# Patient Record
Sex: FEMALE | Race: WHITE | URBAN - NONMETROPOLITAN AREA
[De-identification: names, ages, dates, MRNs, and addresses within clinical notes are randomized per-mention and may not be internally consistent; named-entity substitution may affect disease eponyms.]

---

## 2017-03-15 ENCOUNTER — OFFICE VISIT (OUTPATIENT)
Dept: CARDIOLOGY CLINIC | Age: 77
End: 2017-03-15

## 2017-03-15 VITALS
HEIGHT: 62 IN | SYSTOLIC BLOOD PRESSURE: 118 MMHG | WEIGHT: 144 LBS | DIASTOLIC BLOOD PRESSURE: 70 MMHG | HEART RATE: 80 BPM | BODY MASS INDEX: 26.5 KG/M2

## 2017-03-15 DIAGNOSIS — R00.2 PALPITATION: ICD-10-CM

## 2017-03-15 DIAGNOSIS — E78.49 OTHER HYPERLIPIDEMIA: Primary | ICD-10-CM

## 2017-03-15 DIAGNOSIS — I10 ESSENTIAL HYPERTENSION: ICD-10-CM

## 2017-03-15 PROCEDURE — 99999 PR OFFICE/OUTPT VISIT,PROCEDURE ONLY: CPT | Performed by: INTERNAL MEDICINE

## 2017-03-15 PROCEDURE — 1036F TOBACCO NON-USER: CPT | Performed by: INTERNAL MEDICINE

## 2017-03-15 RX ORDER — CETIRIZINE HYDROCHLORIDE 10 MG/1
10 TABLET ORAL DAILY
COMMUNITY

## 2017-03-15 RX ORDER — RANITIDINE 150 MG/1
150 TABLET ORAL PRN
COMMUNITY
End: 2021-06-10

## 2017-03-15 RX ORDER — MONTELUKAST SODIUM 10 MG/1
10 TABLET ORAL NIGHTLY
COMMUNITY
End: 2017-10-16 | Stop reason: SDUPTHER

## 2017-03-15 RX ORDER — FLUTICASONE PROPIONATE 50 MCG
1 SPRAY, SUSPENSION (ML) NASAL PRN
COMMUNITY

## 2017-05-05 ENCOUNTER — TELEPHONE (OUTPATIENT)
Dept: CARDIOLOGY CLINIC | Age: 77
End: 2017-05-05

## 2017-06-19 ENCOUNTER — TELEPHONE (OUTPATIENT)
Dept: CARDIOLOGY CLINIC | Age: 77
End: 2017-06-19

## 2017-06-19 DIAGNOSIS — R07.9 CHEST PAIN, UNSPECIFIED TYPE: Primary | ICD-10-CM

## 2017-07-06 ENCOUNTER — HOSPITAL ENCOUNTER (OUTPATIENT)
Dept: NON INVASIVE DIAGNOSTICS | Age: 77
Discharge: HOME | End: 2017-07-06
Attending: INTERNAL MEDICINE | Admitting: INTERNAL MEDICINE

## 2017-07-06 DIAGNOSIS — R07.9 CHEST PAIN: ICD-10-CM

## 2017-07-10 ENCOUNTER — OFFICE VISIT (OUTPATIENT)
Dept: CARDIOLOGY CLINIC | Age: 77
End: 2017-07-10

## 2017-07-10 VITALS
SYSTOLIC BLOOD PRESSURE: 110 MMHG | HEIGHT: 62 IN | WEIGHT: 149 LBS | HEART RATE: 84 BPM | BODY MASS INDEX: 27.42 KG/M2 | DIASTOLIC BLOOD PRESSURE: 68 MMHG

## 2017-07-10 DIAGNOSIS — I10 ESSENTIAL HYPERTENSION: ICD-10-CM

## 2017-07-10 DIAGNOSIS — E78.49 OTHER HYPERLIPIDEMIA: Primary | ICD-10-CM

## 2017-07-10 PROCEDURE — 1123F ACP DISCUSS/DSCN MKR DOCD: CPT | Performed by: INTERNAL MEDICINE

## 2017-07-10 PROCEDURE — G8419 CALC BMI OUT NRM PARAM NOF/U: HCPCS | Performed by: INTERNAL MEDICINE

## 2017-07-10 PROCEDURE — G8427 DOCREV CUR MEDS BY ELIG CLIN: HCPCS | Performed by: INTERNAL MEDICINE

## 2017-07-10 PROCEDURE — 99214 OFFICE O/P EST MOD 30 MIN: CPT | Performed by: INTERNAL MEDICINE

## 2017-07-10 PROCEDURE — 1090F PRES/ABSN URINE INCON ASSESS: CPT | Performed by: INTERNAL MEDICINE

## 2017-07-10 PROCEDURE — G8400 PT W/DXA NO RESULTS DOC: HCPCS | Performed by: INTERNAL MEDICINE

## 2017-07-10 PROCEDURE — 1036F TOBACCO NON-USER: CPT | Performed by: INTERNAL MEDICINE

## 2017-07-10 PROCEDURE — 4040F PNEUMOC VAC/ADMIN/RCVD: CPT | Performed by: INTERNAL MEDICINE

## 2017-10-16 ENCOUNTER — OFFICE VISIT (OUTPATIENT)
Dept: CARDIOLOGY CLINIC | Age: 77
End: 2017-10-16

## 2017-10-16 VITALS
HEIGHT: 62 IN | WEIGHT: 144 LBS | BODY MASS INDEX: 26.5 KG/M2 | DIASTOLIC BLOOD PRESSURE: 70 MMHG | HEART RATE: 88 BPM | SYSTOLIC BLOOD PRESSURE: 120 MMHG

## 2017-10-16 DIAGNOSIS — E78.49 OTHER HYPERLIPIDEMIA: Primary | ICD-10-CM

## 2017-10-16 DIAGNOSIS — R00.2 PALPITATION: ICD-10-CM

## 2017-10-16 DIAGNOSIS — I10 ESSENTIAL HYPERTENSION: ICD-10-CM

## 2017-10-16 PROCEDURE — 1036F TOBACCO NON-USER: CPT | Performed by: INTERNAL MEDICINE

## 2017-10-16 PROCEDURE — 99999 PR OFFICE/OUTPT VISIT,PROCEDURE ONLY: CPT | Performed by: INTERNAL MEDICINE

## 2017-10-16 RX ORDER — MONTELUKAST SODIUM 10 MG/1
10 TABLET ORAL NIGHTLY PRN
Qty: 30 TABLET | Refills: 0
Start: 2017-10-16 | End: 2020-07-02

## 2017-10-16 NOTE — PROGRESS NOTES
Cardiac Follow up    Referring Provider:  Katheryn Paez MD     Chief Complaint   Patient presents with    3 Month Follow-Up    Hypertension    Swelling     in feet         History of Present Illness:  Mrs. Gladis Dias is a 68year old female here today in follow up. In May, she went to the ER for left arm pain. She had a follow up stress echo in July, which was unremarkable(aortic sclerosis) She follows for her history of palpitations, hypertension, and hyperlipidemia. She has had a gastric fundoplication in the past.  She has a  pulmonary embolism related to airline travel approximately     Today, Ric Pena has been feeling well. She has no chest pain, shortness of breath, palpitations, or dizziness. She does get some mild swelling in her feet on occasion, none today. She remains active without any difficulties. Her and her  are doing independent learning in assisted classes and enjoying it. They plan to go to [place] for a week in January. Past Medical History   has a past medical history of Essential hypertension and Hyperlipidemia. Pulmonary embolism    Surgical History:   has a past surgical history that includes Breast biopsy; Lithotripsy; Hysterectomy; Gastric fundoplication; and shoulder surgery (Left, 4-). Social History:   reports that she has quit smoking. She does not have any smokeless tobacco history on file. She reports that she drinks about 2.4 oz of alcohol per week . She reports that she does not use drugs. Family History:  family history includes Heart Disease in her father.      Home Medications:    Current Outpatient Prescriptions:     aspirin 81 MG tablet, Take 81 mg by mouth daily, Disp: , Rfl:     cetirizine (ZYRTEC ALLERGY) 10 MG tablet, Take 10 mg by mouth daily, Disp: , Rfl:     ranitidine (ZANTAC) 150 MG tablet, Take 150 mg by mouth as needed for Heartburn, Disp: , Rfl:     fluticasone (FLONASE) 50 MCG/ACT nasal spray, 1 spray by Nasal route as needed for Rhinitis, Disp: , Rfl:     potassium chloride (MICRO-K) 10 MEQ CR capsule, Take 10 mEq by mouth daily, Disp: , Rfl:     albuterol (PROVENTIL HFA;VENTOLIN HFA) 108 (90 BASE) MCG/ACT inhaler, Inhale 2 puffs into the lungs every 6 hours as needed for Wheezing., Disp: , Rfl:     Budesonide-Formoterol Fumarate (SYMBICORT IN),  Inhale 2 puffs into the lungs as needed , Disp: , Rfl:     atorvastatin (LIPITOR) 20 MG tablet, Take 20 mg by mouth daily. , Disp: , Rfl:     hydrochlorothiazide (MICROZIDE) 12.5 MG capsule, Take 12.5 mg by mouth daily. , Disp: , Rfl:     montelukast (SINGULAIR) 10 MG tablet, Take 10 mg by mouth nightly, Disp: , Rfl:     zoledronic acid (RECLAST) 5 MG/100ML SOLN, Infuse 5 mg intravenously once., Disp: , Rfl:       Allergies:  Aleve [naproxen sodium]     Review of Systems:   · Constitutional: there has been no unanticipated weight loss. fatigued   · Eyes: No visual changes or diplopia. No scleral icterus. · ENT: No Headaches, hearing loss or vertigo. No mouth sores or sore throat. · Cardiovascular: Reviewed in HPI  · Respiratory: No cough or wheezing, no sputum production. Short of breath  · Gastrointestinal: No abdominal pain, appetite loss, blood in stools. No change in bowel or bladder habits. · Genitourinary: No dysuria, trouble voiding, or hematuria. · Musculoskeletal:  No gait disturbance, weakness or joint complaints. · Integumentary: No rash or pruritis. · Neurological: No headache, diplopia, change in muscle strength, numbness or tingling. No change in gait, balance, coordination, mood, affect, memory, mentation, behavior. Otherwise, reviewed in HPI  · Psychiatric:  Rather substantial family stress  · Endocrine: No malaise, fatigue or temperature intolerance. No excessive thirst, fluid intake, or urination. No tremor. · Hematologic/Lymphatic: No abnormal bruising or bleeding, blood clots or swollen lymph nodes.   · Allergic/Immunologic:  Occasional nasal congestion, uses OTC antihistamine-nonsedating    Physical Examination:    Vitals:    10/16/17 0903   BP: 120/70   Pulse: 88        Constitutional and General Appearance:  NAD, pleasant  Respiratory:  · Normal excursion and expansion without use of accessory muscles  · Resp Auscultation: clear to ascultation  Cardiovascular:  · The apical impulse is not displaced  · Heart tones are crisp and normal  · Cervical veins are not engorged  · The carotid upstroke is normal in amplitude and contour without delay or bruit  · There is no clubbing, cyanosis of the extremities. · No edema  Abdomen:  · No masses or tenderness  · Bowel sounds present  · No organomegaly appreciated  Neurological/Psychiatric:  · Alert and oriented in all spheres  · Moves all extremities well  · Exhibits normal gait balance and coordination  · No abnormalities of mood, affect, memory, mentation, or behavior are noted    7/6/17 echo  Summary   -Normal left ventricle size, wall thickness and systolic function with an   estimated ejection fraction of 55%.  -The aortic valve appears sclerotic but opens well.   -There is mild-moderate tricuspid regurgitation with RVSP estimated at 31   mmHg. Assessment/Plan:   1. Hyperlipemia:  8/2016: , HDL 63, , TG 92; on atorvastatin 20 mg daily   2. Hypertension: /70 (Site: Left Arm, Position: Sitting, Cuff Size: Medium Adult)   Pulse 88   Ht 5' 2\" (1.575 m)   Wt 144 lb (65.3 kg)   BMI 26.34 kg/m²  well-controlled  3. Palpitations-resolved 7/17 stress echo unremarkable      Mrs. Vern Wu continues to be very well from a cardiac standpoint. Medications reviewed, no changes warranted. Follow up again with Dr Adin Dang in 6 months      Sulaiman Good M.D., [facility]    NOTE:  This report was transcribed using voice recognition software. Every effort was made to ensure accuracy; however, inadvertent computerized transcription errors may be present.

## 2018-05-31 ENCOUNTER — OFFICE VISIT (OUTPATIENT)
Dept: CARDIOLOGY CLINIC | Age: 78
End: 2018-05-31

## 2018-05-31 VITALS
WEIGHT: 144.6 LBS | HEIGHT: 62 IN | HEART RATE: 76 BPM | BODY MASS INDEX: 26.61 KG/M2 | DIASTOLIC BLOOD PRESSURE: 68 MMHG | SYSTOLIC BLOOD PRESSURE: 118 MMHG

## 2018-05-31 DIAGNOSIS — I10 ESSENTIAL HYPERTENSION: ICD-10-CM

## 2018-05-31 DIAGNOSIS — R00.2 PALPITATION: ICD-10-CM

## 2018-05-31 DIAGNOSIS — E78.49 OTHER HYPERLIPIDEMIA: Primary | ICD-10-CM

## 2018-05-31 PROCEDURE — G8427 DOCREV CUR MEDS BY ELIG CLIN: HCPCS | Performed by: INTERNAL MEDICINE

## 2018-05-31 PROCEDURE — 1036F TOBACCO NON-USER: CPT | Performed by: INTERNAL MEDICINE

## 2018-05-31 PROCEDURE — G8419 CALC BMI OUT NRM PARAM NOF/U: HCPCS | Performed by: INTERNAL MEDICINE

## 2018-05-31 PROCEDURE — 1123F ACP DISCUSS/DSCN MKR DOCD: CPT | Performed by: INTERNAL MEDICINE

## 2018-05-31 PROCEDURE — 4040F PNEUMOC VAC/ADMIN/RCVD: CPT | Performed by: INTERNAL MEDICINE

## 2018-05-31 PROCEDURE — 99214 OFFICE O/P EST MOD 30 MIN: CPT | Performed by: INTERNAL MEDICINE

## 2018-05-31 PROCEDURE — G8400 PT W/DXA NO RESULTS DOC: HCPCS | Performed by: INTERNAL MEDICINE

## 2018-05-31 PROCEDURE — 1090F PRES/ABSN URINE INCON ASSESS: CPT | Performed by: INTERNAL MEDICINE

## 2018-05-31 RX ORDER — ATORVASTATIN CALCIUM 40 MG/1
40 TABLET, FILM COATED ORAL DAILY
Qty: 90 TABLET | Refills: 3 | Status: SHIPPED | OUTPATIENT
Start: 2018-05-31 | End: 2018-06-01 | Stop reason: DRUGHIGH

## 2018-05-31 RX ORDER — OMEPRAZOLE 20 MG/1
20 CAPSULE, DELAYED RELEASE ORAL 2 TIMES DAILY
Qty: 60 CAPSULE | Refills: 5
Start: 2018-05-31 | End: 2020-07-02

## 2018-05-31 RX ORDER — OMEPRAZOLE 20 MG/1
20 CAPSULE, DELAYED RELEASE ORAL DAILY
COMMUNITY
End: 2018-05-31 | Stop reason: SDUPTHER

## 2018-06-01 ENCOUNTER — TELEPHONE (OUTPATIENT)
Dept: CARDIOLOGY CLINIC | Age: 78
End: 2018-06-01

## 2018-06-01 RX ORDER — ATORVASTATIN CALCIUM 20 MG/1
20 TABLET, FILM COATED ORAL DAILY
Qty: 30 TABLET | Refills: 11 | Status: SHIPPED | OUTPATIENT
Start: 2018-06-01 | End: 2018-10-04 | Stop reason: SDUPTHER

## 2018-06-04 ENCOUNTER — TELEPHONE (OUTPATIENT)
Dept: CARDIOLOGY CLINIC | Age: 78
End: 2018-06-04

## 2018-06-05 ENCOUNTER — TELEPHONE (OUTPATIENT)
Dept: CARDIOLOGY CLINIC | Age: 78
End: 2018-06-05

## 2018-06-06 ENCOUNTER — TELEPHONE (OUTPATIENT)
Dept: CARDIOLOGY CLINIC | Age: 78
End: 2018-06-06

## 2018-08-24 DIAGNOSIS — R93.1 ELEVATED CORONARY ARTERY CALCIUM SCORE: Primary | ICD-10-CM

## 2018-08-24 DIAGNOSIS — Z79.899 OTHER LONG TERM (CURRENT) DRUG THERAPY: ICD-10-CM

## 2018-08-28 ENCOUNTER — PROCEDURE VISIT (OUTPATIENT)
Dept: CARDIOLOGY CLINIC | Age: 78
End: 2018-08-28

## 2018-08-28 DIAGNOSIS — E78.49 OTHER HYPERLIPIDEMIA: Primary | ICD-10-CM

## 2018-08-28 DIAGNOSIS — R93.1 ELEVATED CORONARY ARTERY CALCIUM SCORE: ICD-10-CM

## 2018-08-28 DIAGNOSIS — Z79.899 OTHER LONG TERM (CURRENT) DRUG THERAPY: ICD-10-CM

## 2018-08-28 PROCEDURE — 93351 STRESS TTE COMPLETE: CPT | Performed by: INTERNAL MEDICINE

## 2018-08-28 PROCEDURE — 93320 DOPPLER ECHO COMPLETE: CPT | Performed by: INTERNAL MEDICINE

## 2018-08-28 PROCEDURE — 93325 DOPPLER ECHO COLOR FLOW MAPG: CPT | Performed by: INTERNAL MEDICINE

## 2018-10-01 LAB
CHOLESTEROL, TOTAL: 183 MG/DL
CHOLESTEROL: 128 MG/DL
HDLC SERPL-MCNC: 55 MG/DL
LDL CHOLESTEROL CALCULATED: 106 MG/DL
TRIGL SERPL-MCNC: 110 MG/DL

## 2018-10-04 ENCOUNTER — TELEPHONE (OUTPATIENT)
Dept: CARDIOLOGY CLINIC | Age: 78
End: 2018-10-04

## 2018-10-04 RX ORDER — ATORVASTATIN CALCIUM 40 MG/1
40 TABLET, FILM COATED ORAL DAILY
Qty: 90 TABLET | Refills: 3 | Status: SHIPPED | OUTPATIENT
Start: 2018-10-04 | End: 2018-12-07 | Stop reason: SDUPTHER

## 2018-12-07 ENCOUNTER — OFFICE VISIT (OUTPATIENT)
Dept: CARDIOLOGY CLINIC | Age: 78
End: 2018-12-07
Payer: MEDICARE

## 2018-12-07 VITALS
DIASTOLIC BLOOD PRESSURE: 72 MMHG | HEART RATE: 72 BPM | WEIGHT: 145.3 LBS | SYSTOLIC BLOOD PRESSURE: 116 MMHG | BODY MASS INDEX: 26.74 KG/M2 | HEIGHT: 62 IN

## 2018-12-07 DIAGNOSIS — R00.2 PALPITATION: ICD-10-CM

## 2018-12-07 DIAGNOSIS — E78.49 OTHER HYPERLIPIDEMIA: Primary | ICD-10-CM

## 2018-12-07 PROCEDURE — G8419 CALC BMI OUT NRM PARAM NOF/U: HCPCS | Performed by: INTERNAL MEDICINE

## 2018-12-07 PROCEDURE — 1036F TOBACCO NON-USER: CPT | Performed by: INTERNAL MEDICINE

## 2018-12-07 PROCEDURE — 99214 OFFICE O/P EST MOD 30 MIN: CPT | Performed by: INTERNAL MEDICINE

## 2018-12-07 PROCEDURE — 4040F PNEUMOC VAC/ADMIN/RCVD: CPT | Performed by: INTERNAL MEDICINE

## 2018-12-07 PROCEDURE — G8427 DOCREV CUR MEDS BY ELIG CLIN: HCPCS | Performed by: INTERNAL MEDICINE

## 2018-12-07 PROCEDURE — G8400 PT W/DXA NO RESULTS DOC: HCPCS | Performed by: INTERNAL MEDICINE

## 2018-12-07 PROCEDURE — 1101F PT FALLS ASSESS-DOCD LE1/YR: CPT | Performed by: INTERNAL MEDICINE

## 2018-12-07 PROCEDURE — G8484 FLU IMMUNIZE NO ADMIN: HCPCS | Performed by: INTERNAL MEDICINE

## 2018-12-07 PROCEDURE — 1123F ACP DISCUSS/DSCN MKR DOCD: CPT | Performed by: INTERNAL MEDICINE

## 2018-12-07 PROCEDURE — 1090F PRES/ABSN URINE INCON ASSESS: CPT | Performed by: INTERNAL MEDICINE

## 2018-12-07 RX ORDER — ATORVASTATIN CALCIUM 40 MG/1
40 TABLET, FILM COATED ORAL DAILY
Qty: 90 TABLET | Refills: 3 | Status: SHIPPED | OUTPATIENT
Start: 2018-12-07 | End: 2019-10-20 | Stop reason: SDUPTHER

## 2018-12-07 RX ORDER — HYDROCHLOROTHIAZIDE 12.5 MG/1
12.5 CAPSULE, GELATIN COATED ORAL DAILY
Qty: 90 CAPSULE | Refills: 3 | Status: SHIPPED | OUTPATIENT
Start: 2018-12-07 | End: 2019-11-18 | Stop reason: SDUPTHER

## 2018-12-07 RX ORDER — EZETIMIBE 10 MG/1
10 TABLET ORAL DAILY
Qty: 90 TABLET | Refills: 3 | Status: SHIPPED | OUTPATIENT
Start: 2018-12-07 | End: 2019-11-22 | Stop reason: SDUPTHER

## 2018-12-07 NOTE — PROGRESS NOTES
[address]   Cardiac f/up    Referring Provider:  Dr Lauren Medina     Chief Complaint   Patient presents with    6 Month Follow-Up    Hypertension      History of Present Illness:  Mrs. Liz Guajardo is here today in routine follow up; she is a former patient of Dr. Mechelle Cerda. She follows for her history of palpitations, hypertension, and hyperlipidemia. She had a stress echo in 2017 for left arm pain which was unremarkable (aortic sclerosis). She has had a gastric fundoplication in the past.  She had a  pulmonary embolism related to airline travel. Her father  age 48 of MI; her sister  in her 42's of possible arrhythmias.     Today, she is doing well. Lipids improved. Moderatley high coronary calcium LAD, RCA  Past Medical History:   has a past medical history of Essential hypertension and Hyperlipidemia. Surgical History:   has a past surgical history that includes Breast biopsy; Lithotripsy; Hysterectomy; Gastric fundoplication; and shoulder surgery (Left, 2013). Social History:   reports that she has quit smoking. She has never used smokeless tobacco. She reports that she drinks about 2.4 oz of alcohol per week . She reports that she does not use drugs. Family History:  family history includes Heart Disease in her father. Home Medications:  Prior to Admission medications    Medication Sig Start Date End Date Taking?  Authorizing Provider   atorvastatin (LIPITOR) 40 MG tablet Take 1 tablet by mouth daily 10/4/18  Yes Sarah Wolfe MD   omeprazole (PRILOSEC) 20 MG delayed release capsule Take 1 capsule by mouth 2 times daily  Patient taking differently: Take 20 mg by mouth 2 times daily as needed  18  Yes Sarah Wolfe MD   montelukast (SINGULAIR) 10 MG tablet Take 1 tablet by mouth nightly as needed (congestion) 10/16/17  Yes Som Arthur MD   aspirin 81 MG tablet Take 81 mg by mouth daily   Yes Historical Provider, MD   cetirizine (ZYRTEC ALLERGY) 10 MG tablet Take

## 2019-01-22 ENCOUNTER — TELEPHONE (OUTPATIENT)
Dept: CARDIOLOGY CLINIC | Age: 79
End: 2019-01-22

## 2019-01-22 LAB
ALT SERPL-CCNC: 23 IU/L (ref 10–35)
AST SERPL-CCNC: 21 IU/L (ref 10–35)
CHOLESTEROL, TOTAL: 146 MG/DL
CHOLESTEROL: 91 MG/DL
HDLC SERPL-MCNC: 55 MG/DL
LDL CHOLESTEROL CALCULATED: 76 MG/DL
TRIGL SERPL-MCNC: 77 MG/DL

## 2019-05-21 NOTE — PROGRESS NOTES
[facility]   Cardiac f/up    Referring Provider:  Dr Joselyn Butler     Chief Complaint   Patient presents with    Hyperlipidemia    Chest Pain     intermittent. not currently active. saw PCP for this       History of Present Illness:  Mrs. Michael Evans is a former patient of Dr. Kassy Ware. She follows for her history of palpitations, hypertension, and hyperlipidemia. She had a stress echo in 2017 for left arm pain which was unremarkable (aortic sclerosis). She has had a gastric fundoplication in the past.  She had a  pulmonary embolism related to airline travel. Her father  age 48 of MI; her sister  in her 42's of possible arrhythmias.     Today, she is doing well. She has been active working in her garden. States she has been having some intermittent \"chest pain\", not related to activity, could be lying in bed and it occurs. One day it lasted 3 hours, she considered going to the ED at that time. She has also seen her pcp, who thought it was acid reflux and prescribed prilosec. She only took it for 3 days, did not feel it helped. States she can not tell when it occurred last. Denies SALAZAR/PND, palpitations, light-headedness, edema. Past Medical History:   has a past medical history of Essential hypertension and Hyperlipidemia. Surgical History:   has a past surgical history that includes Breast biopsy; Lithotripsy; Hysterectomy; Gastric fundoplication; and shoulder surgery (Left, 2013). Social History:   reports that she has quit smoking. She has never used smokeless tobacco. She reports that she drinks about 2.4 oz of alcohol per week. She reports that she does not use drugs. Family History:  family history includes Heart Disease in her father. Home Medications:  Prior to Admission medications    Medication Sig Start Date End Date Taking?  Authorizing Provider   atorvastatin (LIPITOR) 40 MG tablet Take 1 tablet by mouth daily 18  Yes Moni Beaulieu MD hydrochlorothiazide (MICROZIDE) 12.5 MG capsule Take 1 capsule by mouth daily 12/7/18  Yes Kristy Herbert MD   ezetimibe (ZETIA) 10 MG tablet Take 1 tablet by mouth daily 12/7/18  Yes Kristy Herbert MD   omeprazole (PRILOSEC) 20 MG delayed release capsule Take 1 capsule by mouth 2 times daily  Patient taking differently: Take 20 mg by mouth 2 times daily as needed  5/31/18  Yes Kristy Herbert MD   montelukast (SINGULAIR) 10 MG tablet Take 1 tablet by mouth nightly as needed (congestion) 10/16/17  Yes Larissa Stanford MD   aspirin 81 MG tablet Take 81 mg by mouth daily   Yes Historical Provider, MD   cetirizine (ZYRTEC ALLERGY) 10 MG tablet Take 10 mg by mouth daily   Yes Historical Provider, MD   ranitidine (ZANTAC) 150 MG tablet Take 150 mg by mouth as needed for Heartburn   Yes Historical Provider, MD   fluticasone (FLONASE) 50 MCG/ACT nasal spray 1 spray by Nasal route as needed for Rhinitis   Yes Historical Provider, MD   potassium chloride (MICRO-K) 10 MEQ CR capsule Take 10 mEq by mouth daily   Yes Historical Provider, MD   albuterol (PROVENTIL HFA;VENTOLIN HFA) 108 (90 BASE) MCG/ACT inhaler Inhale 2 puffs into the lungs every 6 hours as needed for Wheezing. Yes Historical Provider, MD   Budesonide-Formoterol Fumarate (SYMBICORT IN)   Inhale 2 puffs into the lungs as needed    Yes Historical Provider, MD        Allergies:  Aleve [naproxen sodium]     Review of Systems:   · Constitutional: there has been no unanticipated weight loss. There's been no change in energy level, sleep pattern, or activity level. · Eyes: No visual changes or diplopia. No scleral icterus. · ENT: No Headaches, hearing loss or vertigo. No mouth sores or sore throat. · Cardiovascular: Reviewed in HPI  · Respiratory: No cough or wheezing, no sputum production. No hematemesis. · Gastrointestinal: No abdominal pain, appetite loss, blood in stools. No change in bowel or bladder habits.   · Genitourinary: No dysuria, trouble voiding, or hematuria. · Musculoskeletal:  No gait disturbance, weakness or joint complaints. · Integumentary: No rash or pruritis. · Neurological: No headache, diplopia, change in muscle strength, numbness or tingling. No change in gait, balance, coordination, mood, affect, memory, mentation, behavior. · Psychiatric: No anxiety, no depression. · Endocrine: No malaise, fatigue or temperature intolerance. No excessive thirst, fluid intake, or urination. No tremor. · Hematologic/Lymphatic: No abnormal bruising or bleeding, blood clots or swollen lymph nodes. · Allergic/Immunologic: No nasal congestion or hives. Physical Examination:    Vitals:    05/28/19 0905   BP: 112/67   Pulse: 93        Constitutional and General Appearance: Appears stated age, NAD   Skin:good turgor,intact without lesions  HEENT: EOMI ,normal  Neck:no JVD    Respiratory:  · Normal excursion and expansion without use of accessory muscles  · Resp Auscultation: Normal breath sounds without dullness  Cardiovascular:  · The apical impulses not displaced  · Heart tones are crisp and normal  · Cervical veins are not engorged  · The carotid upstroke is normal in amplitude and contour without delay or bruit  · Peripheral pulses are symmetrical and full  · There is no clubbing, cyanosis of the extremities.   · No edema  · Femoral Arteries: 2+ and equal  · Pedal Pulses: 2+ and equal   Abdomen:  · No masses or tenderness  · Liver/Spleen: No Abnormalities Noted  Neurological/Psychiatric:  · Alert and oriented in all spheres  · Moves all extremities well  · Exhibits normal gait balance and coordination  · No abnormalities of mood, affect, memory, mentation, or behavior are noted    Assessment:    Chest pain, intermittent, nonexertional    -Stress Echo today - normal     Hypertension  Well controlled  /70 (Site: Left Arm, Position: Sitting, Cuff Size: Medium Adult)   Pulse 88   Ht 5' 2\" (1.575 m)   Wt 144 lb (65.3 kg)   BMI 26.34 kg/m² Hyperlipidemia  1/22/19> , TG 96, HDL 52,   40 mg daily      6/1/18   CORONARY ARTERY CALCIUM SCORE FINDINGS:       ARTERY    SCORE       LM                114   LAD              170   CX                13   RCA             8       TOTAL         305         Palpitations, history of  Resolved   7/2017 stress echo was unremarkable  8/28/18 Normal stress echocardiogram study  5/28/19 Normal     Acid reflux  Usually at night   Prilosec 20mg  Bid.- does not take   Per GI -difficulty emptying colon       Plan:  Mrs. Gladis Erwin has a stable cardiac status. Recent labs rev'd.  1. Stress Echo today to evaluate chest pain is normal   2. Will continue with risk factor modifications. 3. Return for regular follow up in 6 months. I appreciate the opportunity of cooperating in the care of this individual.    Ace Frausto. Fronie Mohs, M.D., [address] attestation: This note was scribed in the presence of Dr. Fronie Mohs MD, by Wagner Foley RN. The scribe's documentation has been prepared under my direction and personally reviewed by me in its entirety. I confirm that the note above accurately reflects all work, treatment, procedures, and medical decision making performed by me.

## 2019-05-28 ENCOUNTER — PROCEDURE VISIT (OUTPATIENT)
Dept: CARDIOLOGY CLINIC | Age: 79
End: 2019-05-28
Payer: MEDICARE

## 2019-05-28 ENCOUNTER — OFFICE VISIT (OUTPATIENT)
Dept: CARDIOLOGY CLINIC | Age: 79
End: 2019-05-28
Payer: MEDICARE

## 2019-05-28 VITALS
DIASTOLIC BLOOD PRESSURE: 67 MMHG | HEIGHT: 62 IN | SYSTOLIC BLOOD PRESSURE: 112 MMHG | HEART RATE: 93 BPM | BODY MASS INDEX: 25.58 KG/M2 | WEIGHT: 139 LBS

## 2019-05-28 DIAGNOSIS — I10 ESSENTIAL HYPERTENSION: ICD-10-CM

## 2019-05-28 DIAGNOSIS — E78.49 OTHER HYPERLIPIDEMIA: ICD-10-CM

## 2019-05-28 DIAGNOSIS — R07.9 CHEST PAIN, UNSPECIFIED TYPE: ICD-10-CM

## 2019-05-28 DIAGNOSIS — R07.9 CHEST PAIN, UNSPECIFIED TYPE: Primary | ICD-10-CM

## 2019-05-28 LAB
LV EF: 50 %
LVEF MODALITY: NORMAL

## 2019-05-28 PROCEDURE — G8427 DOCREV CUR MEDS BY ELIG CLIN: HCPCS | Performed by: INTERNAL MEDICINE

## 2019-05-28 PROCEDURE — G8400 PT W/DXA NO RESULTS DOC: HCPCS | Performed by: INTERNAL MEDICINE

## 2019-05-28 PROCEDURE — 99214 OFFICE O/P EST MOD 30 MIN: CPT | Performed by: INTERNAL MEDICINE

## 2019-05-28 PROCEDURE — G8419 CALC BMI OUT NRM PARAM NOF/U: HCPCS | Performed by: INTERNAL MEDICINE

## 2019-05-28 PROCEDURE — 93351 STRESS TTE COMPLETE: CPT | Performed by: INTERNAL MEDICINE

## 2019-05-28 PROCEDURE — 93320 DOPPLER ECHO COMPLETE: CPT | Performed by: INTERNAL MEDICINE

## 2019-05-28 PROCEDURE — 1090F PRES/ABSN URINE INCON ASSESS: CPT | Performed by: INTERNAL MEDICINE

## 2019-05-28 PROCEDURE — 1036F TOBACCO NON-USER: CPT | Performed by: INTERNAL MEDICINE

## 2019-05-28 PROCEDURE — 1123F ACP DISCUSS/DSCN MKR DOCD: CPT | Performed by: INTERNAL MEDICINE

## 2019-05-28 PROCEDURE — 4040F PNEUMOC VAC/ADMIN/RCVD: CPT | Performed by: INTERNAL MEDICINE

## 2019-10-23 RX ORDER — ATORVASTATIN CALCIUM 40 MG/1
TABLET, FILM COATED ORAL
Qty: 90 TABLET | Refills: 1 | Status: SHIPPED | OUTPATIENT
Start: 2019-10-23 | End: 2020-04-20

## 2019-11-08 ENCOUNTER — OFFICE VISIT (OUTPATIENT)
Dept: CARDIOLOGY CLINIC | Age: 79
End: 2019-11-08
Payer: MEDICARE

## 2019-11-08 VITALS
DIASTOLIC BLOOD PRESSURE: 70 MMHG | BODY MASS INDEX: 27.22 KG/M2 | HEIGHT: 62 IN | SYSTOLIC BLOOD PRESSURE: 110 MMHG | HEART RATE: 68 BPM | WEIGHT: 147.9 LBS

## 2019-11-08 DIAGNOSIS — I10 ESSENTIAL HYPERTENSION: Primary | ICD-10-CM

## 2019-11-08 DIAGNOSIS — E78.49 OTHER HYPERLIPIDEMIA: ICD-10-CM

## 2019-11-08 DIAGNOSIS — R00.2 PALPITATION: ICD-10-CM

## 2019-11-08 PROCEDURE — 1123F ACP DISCUSS/DSCN MKR DOCD: CPT | Performed by: INTERNAL MEDICINE

## 2019-11-08 PROCEDURE — G8400 PT W/DXA NO RESULTS DOC: HCPCS | Performed by: INTERNAL MEDICINE

## 2019-11-08 PROCEDURE — 1036F TOBACCO NON-USER: CPT | Performed by: INTERNAL MEDICINE

## 2019-11-08 PROCEDURE — G8484 FLU IMMUNIZE NO ADMIN: HCPCS | Performed by: INTERNAL MEDICINE

## 2019-11-08 PROCEDURE — 4040F PNEUMOC VAC/ADMIN/RCVD: CPT | Performed by: INTERNAL MEDICINE

## 2019-11-08 PROCEDURE — 99214 OFFICE O/P EST MOD 30 MIN: CPT | Performed by: INTERNAL MEDICINE

## 2019-11-08 PROCEDURE — G8427 DOCREV CUR MEDS BY ELIG CLIN: HCPCS | Performed by: INTERNAL MEDICINE

## 2019-11-08 PROCEDURE — G8417 CALC BMI ABV UP PARAM F/U: HCPCS | Performed by: INTERNAL MEDICINE

## 2019-11-08 PROCEDURE — 1090F PRES/ABSN URINE INCON ASSESS: CPT | Performed by: INTERNAL MEDICINE

## 2019-11-18 RX ORDER — HYDROCHLOROTHIAZIDE 12.5 MG/1
CAPSULE, GELATIN COATED ORAL
Qty: 90 CAPSULE | Refills: 3 | Status: SHIPPED | OUTPATIENT
Start: 2019-11-18 | End: 2020-05-26 | Stop reason: ALTCHOICE

## 2019-11-26 RX ORDER — EZETIMIBE 10 MG/1
TABLET ORAL
Qty: 90 TABLET | Refills: 0 | Status: SHIPPED | OUTPATIENT
Start: 2019-11-26 | End: 2020-02-24

## 2020-02-05 ENCOUNTER — TELEPHONE (OUTPATIENT)
Dept: CARDIOLOGY CLINIC | Age: 80
End: 2020-02-05

## 2020-02-05 NOTE — TELEPHONE ENCOUNTER
[facility]

## 2020-02-24 RX ORDER — EZETIMIBE 10 MG/1
TABLET ORAL
Qty: 90 TABLET | Refills: 0 | Status: SHIPPED | OUTPATIENT
Start: 2020-02-24 | End: 2020-05-26 | Stop reason: SDUPTHER

## 2020-04-20 NOTE — TELEPHONE ENCOUNTER
Prescription refill    Last OV:  05/28/2019    Last Refill:  10/23/2019    Labs:  01/22/2019    Future Appt:  05/26/2020

## 2020-04-21 RX ORDER — ATORVASTATIN CALCIUM 40 MG/1
TABLET, FILM COATED ORAL
Qty: 90 TABLET | Refills: 1 | Status: SHIPPED | OUTPATIENT
Start: 2020-04-21 | End: 2020-10-15

## 2020-05-26 ENCOUNTER — OFFICE VISIT (OUTPATIENT)
Dept: CARDIOLOGY CLINIC | Age: 80
End: 2020-05-26
Payer: MEDICARE

## 2020-05-26 VITALS
HEART RATE: 84 BPM | SYSTOLIC BLOOD PRESSURE: 110 MMHG | DIASTOLIC BLOOD PRESSURE: 58 MMHG | WEIGHT: 146 LBS | HEIGHT: 62 IN | BODY MASS INDEX: 26.87 KG/M2 | TEMPERATURE: 99 F

## 2020-05-26 PROCEDURE — 4040F PNEUMOC VAC/ADMIN/RCVD: CPT | Performed by: INTERNAL MEDICINE

## 2020-05-26 PROCEDURE — G8427 DOCREV CUR MEDS BY ELIG CLIN: HCPCS | Performed by: INTERNAL MEDICINE

## 2020-05-26 PROCEDURE — 99213 OFFICE O/P EST LOW 20 MIN: CPT | Performed by: INTERNAL MEDICINE

## 2020-05-26 PROCEDURE — 1090F PRES/ABSN URINE INCON ASSESS: CPT | Performed by: INTERNAL MEDICINE

## 2020-05-26 PROCEDURE — 1036F TOBACCO NON-USER: CPT | Performed by: INTERNAL MEDICINE

## 2020-05-26 PROCEDURE — G8400 PT W/DXA NO RESULTS DOC: HCPCS | Performed by: INTERNAL MEDICINE

## 2020-05-26 PROCEDURE — 1123F ACP DISCUSS/DSCN MKR DOCD: CPT | Performed by: INTERNAL MEDICINE

## 2020-05-26 PROCEDURE — 93000 ELECTROCARDIOGRAM COMPLETE: CPT | Performed by: INTERNAL MEDICINE

## 2020-05-26 PROCEDURE — G8417 CALC BMI ABV UP PARAM F/U: HCPCS | Performed by: INTERNAL MEDICINE

## 2020-05-26 RX ORDER — EZETIMIBE 10 MG/1
TABLET ORAL
Qty: 90 TABLET | Refills: 0 | Status: SHIPPED | OUTPATIENT
Start: 2020-05-26 | End: 2020-09-04

## 2020-05-26 NOTE — PROGRESS NOTES
[facility]   Cardiac Follow Up    Referring Provider:  Dr Ced Penn     Chief Complaint   Patient presents with    6 Month Follow-Up     Pt states she has been having problems with dizziness working outside. She has chest pressure when she lays down.  Hyperlipidemia    Hypertension      History of Present Illness:  Mrs. Subhash Lucas is a former patient of Dr. Anjali Villanueva. She follows for her history of palpitations, hypertension, and hyperlipidemia. She had a stress echo in 2017 for left arm pain which was unremarkable (aortic sclerosis). She has had a gastric fundoplication in the past.  She had a  pulmonary embolism related to airline travel. Her father  age 48 of MI; her sister  in her 42's of possible arrhythmias.     Today, she reports episodes of dizziness when working in the yard in the heat. Denies exertional chest pain, SALAZAR/PND, palpitations, edema. She is active, does not smoke. She lives at the [place]. Past Medical History:   has a past medical history of Essential hypertension and Hyperlipidemia. Surgical History:   has a past surgical history that includes Breast biopsy; Lithotripsy; Hysterectomy; Gastric fundoplication; and shoulder surgery (Left, 2013). Social History:   reports that she has quit smoking. She has never used smokeless tobacco. She reports current alcohol use of about 4.0 standard drinks of alcohol per week. She reports that she does not use drugs. Family History:  family history includes Heart Disease in her father. Home Medications:  Prior to Admission medications    Medication Sig Start Date End Date Taking?  Authorizing Provider   linaclotide Shirlyn Medici) 145 MCG capsule Take 145 mcg by mouth nightly 20  Yes Historical Provider, MD   atorvastatin (LIPITOR) 40 MG tablet TAKE ONE TABLET BY MOUTH DAILY 20  Yes Paul Huynh MD   ezetimibe (ZETIA) 10 MG tablet TAKE ONE TABLET BY MOUTH DAILY 20  Yes Paul Hyunh MD

## 2020-07-02 ENCOUNTER — VIRTUAL VISIT (OUTPATIENT)
Dept: CARDIOLOGY CLINIC | Age: 80
End: 2020-07-02

## 2020-07-21 RX ORDER — HYDROCHLOROTHIAZIDE 12.5 MG/1
CAPSULE, GELATIN COATED ORAL
Qty: 90 CAPSULE | Refills: 2 | OUTPATIENT
Start: 2020-07-21

## 2020-09-04 RX ORDER — EZETIMIBE 10 MG/1
TABLET ORAL
Qty: 90 TABLET | Refills: 0 | Status: SHIPPED | OUTPATIENT
Start: 2020-09-04 | End: 2020-12-10

## 2020-10-15 RX ORDER — ATORVASTATIN CALCIUM 40 MG/1
TABLET, FILM COATED ORAL
Qty: 90 TABLET | Refills: 1 | Status: SHIPPED | OUTPATIENT
Start: 2020-10-15 | End: 2021-06-04

## 2020-10-15 NOTE — TELEPHONE ENCOUNTER
RX APPROVAL:      Refill:   Requested Prescriptions     Pending Prescriptions Disp Refills    atorvastatin (LIPITOR) 40 MG tablet [Pharmacy Med Name: ATORVASTATIN 40 MG TABLET] 90 tablet 0     Sig: TAKE ONE TABLET BY MOUTH DAILY      Last OV: 5/26/20  Last EKG:    Last Labs:  Lab Results   Component Value Date    CHOL 126 05/26/2020    TRIG 52 05/26/2020    HDL 49 05/26/2020    LDLCALC 67 05/26/2020     Lab Results   Component Value Date    ALT 17 05/26/2020    AST 21 05/26/2020       Plan and labs reviewed

## 2020-12-10 RX ORDER — EZETIMIBE 10 MG/1
TABLET ORAL
Qty: 90 TABLET | Refills: 1 | Status: SHIPPED | OUTPATIENT
Start: 2020-12-10 | End: 2021-06-02

## 2020-12-10 NOTE — TELEPHONE ENCOUNTER
RX APPROVAL:      Refill:   Requested Prescriptions     Pending Prescriptions Disp Refills    ezetimibe (Azalia Davis) 10 MG tablet [Pharmacy Med Name: EZETIMIBE 10 MG TABLET] 90 tablet 0     Sig: TAKE ONE TABLET BY MOUTH DAILY      Last OV: 5/26/2020   Last EKG:    Last Labs:  Lab Results   Component Value Date    CHOL 126 05/26/2020    TRIG 52 05/26/2020    HDL 49 05/26/2020    LDLCALC 67 05/26/2020     Lab Results   Component Value Date    ALT 17 05/26/2020    AST 21 05/26/2020       Plan and labs reviewed

## 2020-12-18 ENCOUNTER — OFFICE VISIT (OUTPATIENT)
Dept: CARDIOLOGY CLINIC | Age: 80
End: 2020-12-18
Payer: MEDICARE

## 2020-12-18 VITALS
HEART RATE: 68 BPM | BODY MASS INDEX: 25.87 KG/M2 | SYSTOLIC BLOOD PRESSURE: 116 MMHG | RESPIRATION RATE: 20 BRPM | HEIGHT: 63 IN | DIASTOLIC BLOOD PRESSURE: 62 MMHG | OXYGEN SATURATION: 96 % | WEIGHT: 146 LBS

## 2020-12-18 PROCEDURE — G8427 DOCREV CUR MEDS BY ELIG CLIN: HCPCS | Performed by: INTERNAL MEDICINE

## 2020-12-18 PROCEDURE — 1090F PRES/ABSN URINE INCON ASSESS: CPT | Performed by: INTERNAL MEDICINE

## 2020-12-18 PROCEDURE — G8400 PT W/DXA NO RESULTS DOC: HCPCS | Performed by: INTERNAL MEDICINE

## 2020-12-18 PROCEDURE — 1123F ACP DISCUSS/DSCN MKR DOCD: CPT | Performed by: INTERNAL MEDICINE

## 2020-12-18 PROCEDURE — 4040F PNEUMOC VAC/ADMIN/RCVD: CPT | Performed by: INTERNAL MEDICINE

## 2020-12-18 PROCEDURE — 99213 OFFICE O/P EST LOW 20 MIN: CPT | Performed by: INTERNAL MEDICINE

## 2020-12-18 PROCEDURE — G8417 CALC BMI ABV UP PARAM F/U: HCPCS | Performed by: INTERNAL MEDICINE

## 2020-12-18 PROCEDURE — 1036F TOBACCO NON-USER: CPT | Performed by: INTERNAL MEDICINE

## 2020-12-18 PROCEDURE — G8484 FLU IMMUNIZE NO ADMIN: HCPCS | Performed by: INTERNAL MEDICINE

## 2020-12-18 RX ORDER — POLYETHYLENE GLYCOL 3350 17 G/17G
POWDER, FOR SOLUTION ORAL
COMMUNITY
Start: 2020-01-03 | End: 2022-07-26

## 2020-12-18 NOTE — PROGRESS NOTES
[facility]   Cardiac Follow Up    Referring Provider:  Dr Momin Pomeroy     Chief Complaint   Patient presents with    Follow-up    Hypertension    Hyperlipidemia      History of Present Illness:  Mrs. Savannah Turner is a former patient of Dr. Evette Panda. She follows for her history of palpitations, hypertension, and hyperlipidemia. She had a stress echo in 2017 for left arm pain which was unremarkable (aortic sclerosis). She has had a gastric fundoplication in the past.  She had a  pulmonary embolism related to airline travel. Her father  age 48 of MI; her sister  in her 42's of possible arrhythmias.     Today, she reports she has been well. Denies SALAZAR/PND, palpitations, edema. She is active, does not smoke. She lives at the [place]. She has occasional pains in her chest, this is at rest.     Past Medical History:   has a past medical history of Essential hypertension and Hyperlipidemia. Surgical History:   has a past surgical history that includes Breast biopsy; Lithotripsy; Hysterectomy; Gastric fundoplication; and shoulder surgery (Left, 2013). Social History:   reports that she has quit smoking. She has never used smokeless tobacco. She reports current alcohol use of about 4.0 standard drinks of alcohol per week. She reports that she does not use drugs. Family History:  family history includes Heart Disease in her father. Home Medications:  Prior to Admission medications    Medication Sig Start Date End Date Taking?  Authorizing Provider   polyethylene glycol (GLYCOLAX) 17 GM/SCOOP powder Mix 15 doses (255 gm) in 64 oz of fluid, drink slowly over 1 day 1/3/20  Yes Historical Provider, MD   ezetimibe (ZETIA) 10 MG tablet TAKE ONE TABLET BY MOUTH DAILY 12/10/20  Yes Kelly Gary MD   atorvastatin (LIPITOR) 40 MG tablet TAKE ONE TABLET BY MOUTH DAILY 10/15/20  Yes Kelly Gary MD   aspirin 81 MG tablet Take 81 mg by mouth daily   Yes Historical Provider, MD   cetirizine Constitutional and General Appearance: Appears stated age, NAD   Skin:good turgor,intact without lesions  HEENT: EOMI ,normal  Neck:no JVD    Respiratory:  · Normal excursion and expansion without use of accessory muscles  · Resp Auscultation: Normal breath sounds without dullness  Cardiovascular:  · The apical impulses not displaced  · Heart tones are crisp and normal  · Cervical veins are not engorged  · The carotid upstroke is normal in amplitude and contour without delay or bruit  · Peripheral pulses are symmetrical and full  · There is no clubbing, cyanosis of the extremities. · No edema  · Femoral Arteries: 2+ and equal  · Pedal Pulses: 2+ and equal   Abdomen:  · No masses or tenderness  · Liver/Spleen: No Abnormalities Noted  Neurological/Psychiatric:  · Alert and oriented in all spheres  · Moves all extremities well  · Exhibits normal gait balance and coordination  · No abnormalities of mood, affect, memory, mentation, or behavior are noted    Assessment:    Hypertension  Well controlled  Blood pressure 116/62, pulse 68, resp. rate 20, height 5' 3\" (1.6 m), weight 146 lb (66.2 kg), SpO2 96 %. Hyperlipidemia  5/26/20> , TG 52, HDL 49, LDL 67  Lipitor 40 mg daily & Zetia 10 mg   Controlled- check yearly      Palpitations, history of  Resolved   7/2017 stress echo was unremarkable  8/28/18 Normal stress echocardiogram study  5/28/19 Normal   5/26/20 EKG> SR 78       Plan:  Cas Sood has a stable cardiac status. Cardiac test and lab results personally reviewed by me during this office visit and discussed. No med changes   Lipids prior to next OV   Continue risk factor modifications. Call for any change in symptoms. Return for regular follow up in 6 months with stress echo. I appreciate the opportunity of cooperating in the care of this individual.    Dom Corona.  Ibis Giles M.D., [facility]    Patient's problem list, medications, allergies, past medical, surgical, social and family histories

## 2021-04-21 ENCOUNTER — TELEPHONE (OUTPATIENT)
Dept: CARDIOLOGY CLINIC | Age: 81
End: 2021-04-21

## 2021-04-21 NOTE — TELEPHONE ENCOUNTER
Pt calling with concerns of a pain in her mid chest area when she bends over she is asking to be seen sooner,  first available in OX is may 13th  pt thinks she needs to be seen sooner pls call to advise thank you

## 2021-04-21 NOTE — TELEPHONE ENCOUNTER
Pt was not home at the time of the call her  stated they would take the 4/27 appt because he has appt on 4/22, he will run the appt by her if its not ok they will call back

## 2021-04-21 NOTE — TELEPHONE ENCOUNTER
Could add her tomorrow at 9:45 here at [facility] with LES or if she needs it to be OX 4/27 at 9:45a , appt needs w/ EKG. Thanks !

## 2021-04-27 ENCOUNTER — OFFICE VISIT (OUTPATIENT)
Dept: CARDIOLOGY CLINIC | Age: 81
End: 2021-04-27
Payer: MEDICARE

## 2021-04-27 VITALS
DIASTOLIC BLOOD PRESSURE: 64 MMHG | BODY MASS INDEX: 24.92 KG/M2 | WEIGHT: 146 LBS | RESPIRATION RATE: 20 BRPM | OXYGEN SATURATION: 97 % | HEIGHT: 64 IN | SYSTOLIC BLOOD PRESSURE: 116 MMHG | HEART RATE: 95 BPM

## 2021-04-27 DIAGNOSIS — R07.9 CHEST PAIN, UNSPECIFIED TYPE: ICD-10-CM

## 2021-04-27 DIAGNOSIS — R00.2 PALPITATION: ICD-10-CM

## 2021-04-27 DIAGNOSIS — I10 ESSENTIAL HYPERTENSION: Primary | ICD-10-CM

## 2021-04-27 DIAGNOSIS — E78.49 OTHER HYPERLIPIDEMIA: ICD-10-CM

## 2021-04-27 PROCEDURE — G8400 PT W/DXA NO RESULTS DOC: HCPCS | Performed by: INTERNAL MEDICINE

## 2021-04-27 PROCEDURE — 1036F TOBACCO NON-USER: CPT | Performed by: INTERNAL MEDICINE

## 2021-04-27 PROCEDURE — G8427 DOCREV CUR MEDS BY ELIG CLIN: HCPCS | Performed by: INTERNAL MEDICINE

## 2021-04-27 PROCEDURE — 4040F PNEUMOC VAC/ADMIN/RCVD: CPT | Performed by: INTERNAL MEDICINE

## 2021-04-27 PROCEDURE — 93000 ELECTROCARDIOGRAM COMPLETE: CPT | Performed by: INTERNAL MEDICINE

## 2021-04-27 PROCEDURE — 99213 OFFICE O/P EST LOW 20 MIN: CPT | Performed by: INTERNAL MEDICINE

## 2021-04-27 PROCEDURE — 1090F PRES/ABSN URINE INCON ASSESS: CPT | Performed by: INTERNAL MEDICINE

## 2021-04-27 PROCEDURE — 1123F ACP DISCUSS/DSCN MKR DOCD: CPT | Performed by: INTERNAL MEDICINE

## 2021-04-27 PROCEDURE — G8417 CALC BMI ABV UP PARAM F/U: HCPCS | Performed by: INTERNAL MEDICINE

## 2021-04-27 NOTE — PROGRESS NOTES
by mouth daily   Yes Historical Provider, MD   cetirizine (ZYRTEC ALLERGY) 10 MG tablet Take 10 mg by mouth daily   Yes Historical Provider, MD   ranitidine (ZANTAC) 150 MG tablet Take 150 mg by mouth as needed for Heartburn   Yes Historical Provider, MD   fluticasone (FLONASE) 50 MCG/ACT nasal spray 1 spray by Nasal route as needed for Rhinitis   Yes Historical Provider, MD   albuterol (PROVENTIL HFA;VENTOLIN HFA) 108 (90 BASE) MCG/ACT inhaler Inhale 2 puffs into the lungs every 6 hours as needed for Wheezing. Yes Historical Provider, MD   Budesonide-Formoterol Fumarate (SYMBICORT IN) Inhale 2 puffs into the lungs 2 times daily    Yes Historical Provider, MD        Allergies:  Aleve [naproxen sodium]     Review of Systems:   · Constitutional: there has been no unanticipated weight loss. There's been no change in energy level, sleep pattern, or activity level. · Eyes: No visual changes or diplopia. No scleral icterus. · ENT: No Headaches, hearing loss or vertigo. No mouth sores or sore throat. · Cardiovascular: Reviewed in HPI  · Respiratory: No cough or wheezing, no sputum production. No hematemesis. · Gastrointestinal: No abdominal pain, appetite loss, blood in stools. No change in bowel or bladder habits. · Genitourinary: No dysuria, trouble voiding, or hematuria. · Musculoskeletal:  No gait disturbance, weakness or joint complaints. · Integumentary: No rash or pruritis. · Neurological: No headache, diplopia, change in muscle strength, numbness or tingling. No change in gait, balance, coordination, mood, affect, memory, mentation, behavior. · Psychiatric: No anxiety, no depression. · Endocrine: No malaise, fatigue or temperature intolerance. No excessive thirst, fluid intake, or urination. No tremor. · Hematologic/Lymphatic: No abnormal bruising or bleeding, blood clots or swollen lymph nodes. · Allergic/Immunologic: No nasal congestion or hives.     Physical Examination:    Vitals:    04/27/21 0950   BP: 116/64   Pulse: 95   Resp: 20   SpO2: 97%        Constitutional and General Appearance: Appears stated age, NAD   Skin:good turgor,intact without lesions  HEENT: EOMI ,normal  Neck:no JVD    Respiratory:  · Normal excursion and expansion without use of accessory muscles  · Resp Auscultation: Normal breath sounds without dullness  Cardiovascular:  · The apical impulses not displaced  · Heart tones are crisp and normal  · Cervical veins are not engorged  · The carotid upstroke is normal in amplitude and contour without delay or bruit  · Peripheral pulses are symmetrical and full  · There is no clubbing, cyanosis of the extremities. · No edema  · Femoral Arteries: 2+ and equal  · Pedal Pulses: 2+ and equal   Abdomen:  · No masses or tenderness  · Liver/Spleen: No Abnormalities Noted  Neurological/Psychiatric:  · Alert and oriented in all spheres  · Moves all extremities well  · Exhibits normal gait balance and coordination  · No abnormalities of mood, affect, memory, mentation, or behavior are noted    Assessment:    Hypertension  Well controlled  Blood pressure 116/64, pulse 95, resp. rate 20, height 5' 4\" (1.626 m), weight 146 lb (66.2 kg), SpO2 97 %. Hyperlipidemia  5/26/20> , TG 52, HDL 49, LDL 67  Lipitor 40 mg daily & Zetia 10 mg   Controlled- check yearly      Palpitations, history of  Resolved   7/2017 stress echo was unremarkable  8/28/18 Normal stress echocardiogram study  5/28/19 Normal   5/26/20 EKG> SR 78     Chest Pain   Likely gastrointestinal- keep OV scheduled in June    Take Pepcid [facility] regularly for a week       Plan:  Huy Pool has a stable cardiac status. Cardiac test and lab results personally reviewed by me during this office visit and discussed. No med changes   Take Pepcid AC regularly for a week   Continue risk factor modifications. Call for any change in symptoms. Return for regular follow up as scheduled in June stress echo.         I appreciate the opportunity of cooperating in the care of this individual.    Khalif Plascencia. Eusebia Lamar M.D., [facility] [place]    Patient's problem list, medications, allergies, past medical, surgical, social and family histories were reviewed and updated as appropriate. Scribe's attestation: This note was scribed in the presence of Dr. Eusebia Lamar MD, by Hoa Abraham RN. The scribe's documentation has been prepared under my direction and personally reviewed by me in its entirety. I confirm that the note above accurately reflects all work, treatment, procedures, and medical decision making performed by me.

## 2021-06-01 NOTE — TELEPHONE ENCOUNTER
Received refill request for Zetia 10 mg from [employer].     Last OV: 4/27/21 LES    Last Labs: 4/26/20    Last Refill: 12/10/20 #90 with 1 refill    Next Appt: 6/10/21 LES

## 2021-06-02 RX ORDER — EZETIMIBE 10 MG/1
TABLET ORAL
Qty: 90 TABLET | Refills: 0 | Status: SHIPPED | OUTPATIENT
Start: 2021-06-02 | End: 2021-08-26

## 2021-06-04 RX ORDER — ATORVASTATIN CALCIUM 40 MG/1
TABLET, FILM COATED ORAL
Qty: 90 TABLET | Refills: 0 | Status: SHIPPED | OUTPATIENT
Start: 2021-06-04 | End: 2021-08-26

## 2021-06-09 ENCOUNTER — TELEPHONE (OUTPATIENT)
Dept: CARDIOLOGY CLINIC | Age: 81
End: 2021-06-09

## 2021-06-09 NOTE — TELEPHONE ENCOUNTER
She has an appt tomorrow with LES and was supposed to have GXT before appt. She has plantar fasciitis and can to the GXT . Should she keep the appt with LES or rs to later in the year ?

## 2021-06-10 ENCOUNTER — OFFICE VISIT (OUTPATIENT)
Dept: CARDIOLOGY CLINIC | Age: 81
End: 2021-06-10
Payer: MEDICARE

## 2021-06-10 VITALS
DIASTOLIC BLOOD PRESSURE: 70 MMHG | HEART RATE: 87 BPM | WEIGHT: 140.3 LBS | HEIGHT: 64 IN | OXYGEN SATURATION: 98 % | SYSTOLIC BLOOD PRESSURE: 106 MMHG | BODY MASS INDEX: 23.95 KG/M2

## 2021-06-10 DIAGNOSIS — E78.49 OTHER HYPERLIPIDEMIA: ICD-10-CM

## 2021-06-10 DIAGNOSIS — I10 ESSENTIAL HYPERTENSION: Primary | ICD-10-CM

## 2021-06-10 DIAGNOSIS — R07.9 CHEST PAIN, UNSPECIFIED TYPE: ICD-10-CM

## 2021-06-10 DIAGNOSIS — R00.2 PALPITATION: ICD-10-CM

## 2021-06-10 PROCEDURE — 99213 OFFICE O/P EST LOW 20 MIN: CPT | Performed by: INTERNAL MEDICINE

## 2021-06-10 PROCEDURE — G8400 PT W/DXA NO RESULTS DOC: HCPCS | Performed by: INTERNAL MEDICINE

## 2021-06-10 PROCEDURE — G8420 CALC BMI NORM PARAMETERS: HCPCS | Performed by: INTERNAL MEDICINE

## 2021-06-10 PROCEDURE — 1036F TOBACCO NON-USER: CPT | Performed by: INTERNAL MEDICINE

## 2021-06-10 PROCEDURE — G8427 DOCREV CUR MEDS BY ELIG CLIN: HCPCS | Performed by: INTERNAL MEDICINE

## 2021-06-10 PROCEDURE — 4040F PNEUMOC VAC/ADMIN/RCVD: CPT | Performed by: INTERNAL MEDICINE

## 2021-06-10 PROCEDURE — 1090F PRES/ABSN URINE INCON ASSESS: CPT | Performed by: INTERNAL MEDICINE

## 2021-06-10 PROCEDURE — 1123F ACP DISCUSS/DSCN MKR DOCD: CPT | Performed by: INTERNAL MEDICINE

## 2021-06-10 NOTE — PROGRESS NOTES
[address]   Cardiac Follow Up    Referring Provider:  Dr Flores Pool     Chief Complaint   Patient presents with    Palpitations    Hyperlipidemia    Hypertension      History of Present Illness:  . She follows for her history of palpitations, hypertension, and hyperlipidemia. She had a stress echo in 2017 for left arm pain which was unremarkable (aortic sclerosis). She has had a gastric fundoplication in the past.  She had a  pulmonary embolism related to airline travel. Her father  age 48 of MI; her sister  in her 42's of possible arrhythmias.     Today, she still feels pain in the center of the chest, tums helps. Occurs after bending over, never occurs with exertion. She generally ignores pain until it subsides. She recently saw GI. Feels GERD may be the cause. She is under a great deal of stress. She continues to have planter fascitis, is undergoing various treatments. Denies SALAZAR/PND, palpitations, edema. She is active, does not smoke. She lives at the [place]. Past Medical History:   has a past medical history of Essential hypertension and Hyperlipidemia. Surgical History:   has a past surgical history that includes Breast biopsy; Lithotripsy; Hysterectomy; Gastric fundoplication; and shoulder surgery (Left, 2013). Social History:   reports that she has quit smoking. She has never used smokeless tobacco. She reports current alcohol use of about 4.0 standard drinks of alcohol per week. She reports that she does not use drugs. Family History:  family history includes Heart Disease in her father. Home Medications:  Prior to Admission medications    Medication Sig Start Date End Date Taking?  Authorizing Provider   atorvastatin (LIPITOR) 40 MG tablet TAKE ONE TABLET BY MOUTH DAILY 21  Yes Aleta Borges MD   ezetimibe (ZETIA) 10 MG tablet TAKE ONE TABLET BY MOUTH DAILY 21  Yes Aleta Borges MD   polyethylene glycol [facility]) 17 GM/SCOOP powder Mix 15 doses (255 gm) in 64 oz of fluid, drink slowly over 1 day 1/3/20  Yes Historical Provider, MD   aspirin 81 MG tablet Take 81 mg by mouth daily   Yes Historical Provider, MD   cetirizine (ZYRTEC ALLERGY) 10 MG tablet Take 10 mg by mouth daily   Yes Historical Provider, MD   fluticasone (FLONASE) 50 MCG/ACT nasal spray 1 spray by Nasal route as needed for Rhinitis   Yes Historical Provider, MD   albuterol (PROVENTIL HFA;VENTOLIN HFA) 108 (90 BASE) MCG/ACT inhaler Inhale 2 puffs into the lungs every 6 hours as needed for Wheezing. Yes Historical Provider, MD   Budesonide-Formoterol Fumarate (SYMBICORT IN) Inhale 2 puffs into the lungs 2 times daily    Yes Historical Provider, MD        Allergies:  Aleve [naproxen sodium]     Review of Systems:   · Constitutional: there has been no unanticipated weight loss. There's been no change in energy level, sleep pattern, or activity level. · Eyes: No visual changes or diplopia. No scleral icterus. · ENT: No Headaches, hearing loss or vertigo. No mouth sores or sore throat. · Cardiovascular: Reviewed in HPI  · Respiratory: No cough or wheezing, no sputum production. No hematemesis. · Gastrointestinal: No abdominal pain, appetite loss, blood in stools. No change in bowel or bladder habits. · Genitourinary: No dysuria, trouble voiding, or hematuria. · Musculoskeletal:  No gait disturbance, weakness or joint complaints. · Integumentary: No rash or pruritis. · Neurological: No headache, diplopia, change in muscle strength, numbness or tingling. No change in gait, balance, coordination, mood, affect, memory, mentation, behavior. · Psychiatric: No anxiety, no depression. · Endocrine: No malaise, fatigue or temperature intolerance. No excessive thirst, fluid intake, or urination. No tremor. · Hematologic/Lymphatic: No abnormal bruising or bleeding, blood clots or swollen lymph nodes. · Allergic/Immunologic: No nasal congestion or hives.     Physical Examination: Vitals:    06/10/21 1457   BP: 106/70   Pulse: 87   SpO2: 98%        Constitutional and General Appearance: Appears stated age, NAD   Skin:good turgor,intact without lesions  HEENT: EOMI ,normal  Neck:no JVD    Respiratory:  · Normal excursion and expansion without use of accessory muscles  · Resp Auscultation: Normal breath sounds without dullness  Cardiovascular:  · The apical impulses not displaced  · Heart tones are crisp and normal  · Cervical veins are not engorged  · The carotid upstroke is normal in amplitude and contour without delay or bruit  · Peripheral pulses are symmetrical and full  · There is no clubbing, cyanosis of the extremities. · No edema  · Femoral Arteries: 2+ and equal  · Pedal Pulses: 2+ and equal   Abdomen:  · No masses or tenderness  · Liver/Spleen: No Abnormalities Noted  Neurological/Psychiatric:  · Alert and oriented in all spheres  · Moves all extremities well  · Exhibits normal gait balance and coordination  · No abnormalities of mood, affect, memory, mentation, or behavior are noted    Assessment:    Hypertension  Well controlled  Blood pressure 106/70, pulse 87, height 5' 4\" (1.626 m), weight 140 lb 4.8 oz (63.6 kg), SpO2 98 %. Hyperlipidemia  5/26/20> , TG 52, HDL 49, LDL 67  Lipitor 40 mg daily & Zetia 10 mg   Controlled- check yearly      Palpitations, history of  Resolved   7/2017 stress echo was unremarkable  8/28/18 Normal stress echocardiogram study  5/28/19 Normal   5/26/20 EKG> SR 78     Chest Pain   Likely gastrointestinal- keep OV scheduled in June    Take Pepcid [facility] regularly for a week       Plan:  Huy Pool has a stable cardiac status. Cardiac test and lab results personally reviewed by me during this office visit and discussed. No med changes   Take Pepcid AC regularly for a week   Continue risk factor modifications. Call for any change in symptoms. Return for regular follow up as scheduled in June stress echo.         I appreciate the opportunity of cooperating in the care of this individual.    Khalif Plascencia. Eusebia Lamar M.D., [facility] - [place]    Patient's problem list, medications, allergies, past medical, surgical, social and family histories were reviewed and updated as appropriate. Scribe's attestation: This note was scribed in the presence of Dr. Eusebia Lamar MD, by Hoa Abraham RN. The scribe's documentation has been prepared under my direction and personally reviewed by me in its entirety. I confirm that the note above accurately reflects all work, treatment, procedures, and medical decision making performed by me. Lizy Marroquin

## 2021-08-25 NOTE — TELEPHONE ENCOUNTER
Received refill request for Atorvastatin and Zetia from [employer]     Last ov:06/10/2021 LES    Last labs:05/26/2021 Lipid,AST,ALT    Last Refill:06/04/2021 #90 tabs w/ 0 refills for both    Next appointment:on recall list for 12/07/2021 w/LES

## 2021-08-26 RX ORDER — EZETIMIBE 10 MG/1
TABLET ORAL
Qty: 90 TABLET | Refills: 0 | Status: SHIPPED | OUTPATIENT
Start: 2021-08-26 | End: 2021-11-01

## 2021-08-26 RX ORDER — ATORVASTATIN CALCIUM 40 MG/1
TABLET, FILM COATED ORAL
Qty: 90 TABLET | Refills: 0 | Status: SHIPPED | OUTPATIENT
Start: 2021-08-26 | End: 2021-11-24

## 2021-11-01 RX ORDER — EZETIMIBE 10 MG/1
TABLET ORAL
Qty: 90 TABLET | Refills: 0 | Status: SHIPPED | OUTPATIENT
Start: 2021-11-01 | End: 2021-12-01

## 2021-11-01 NOTE — TELEPHONE ENCOUNTER
Received refill request for Zetia 10 mg from [employer].     Last OV: 6/10/21 LES    Last Labs: 5/26/20    Last Refill: 8/26/21 #90 with no refills    Next Appt: 12/7/21 LES

## 2021-11-19 ENCOUNTER — TELEPHONE (OUTPATIENT)
Dept: CARDIOLOGY CLINIC | Age: 81
End: 2021-11-19

## 2021-11-23 NOTE — TELEPHONE ENCOUNTER
Received refill request for Atorvastatin from [address] [place] [address].     Last ov:06/10/2021 LES    Last labs:05/26/2020 Lipid, AST & ALT    Last Refill:08/26/2021 #90 tabs w/ 0 refill    Next appointment:none

## 2021-11-24 RX ORDER — ATORVASTATIN CALCIUM 40 MG/1
TABLET, FILM COATED ORAL
Qty: 90 TABLET | Refills: 0 | Status: SHIPPED | OUTPATIENT
Start: 2021-11-24 | End: 2021-12-01

## 2021-12-01 RX ORDER — EZETIMIBE 10 MG/1
TABLET ORAL
Qty: 90 TABLET | Refills: 0 | Status: SHIPPED | OUTPATIENT
Start: 2021-12-01 | End: 2022-05-18

## 2021-12-01 RX ORDER — ATORVASTATIN CALCIUM 40 MG/1
TABLET, FILM COATED ORAL
Qty: 90 TABLET | Refills: 0 | Status: SHIPPED | OUTPATIENT
Start: 2021-12-01 | End: 2022-07-07

## 2021-12-01 NOTE — TELEPHONE ENCOUNTER
Received refill request for Zetia and atorvastatin from [employer]     Last ov:06/10/2021 LES    Last labs:05/26/2020 Lipid, AST, ALT    Last Refill:Zetia 11/01/2021 #90 tabs w/ 0 refill, Atorvastatin 11/24/2021 #90 w/ 0 refill    Next appointment:none

## 2022-02-07 NOTE — PROGRESS NOTES
drink slowly over 1 day 1/3/20  Yes Historical Provider, MD   cetirizine (ZYRTEC ALLERGY) 10 MG tablet Take 10 mg by mouth daily   Yes Historical Provider, MD   fluticasone (FLONASE) 50 MCG/ACT nasal spray 1 spray by Nasal route as needed for Rhinitis   Yes Historical Provider, MD   albuterol (PROVENTIL HFA;VENTOLIN HFA) 108 (90 BASE) MCG/ACT inhaler Inhale 2 puffs into the lungs every 6 hours as needed for Wheezing. Yes Historical Provider, MD   Budesonide-Formoterol Fumarate (SYMBICORT IN) Inhale 2 puffs into the lungs 2 times daily    Yes Historical Provider, MD   Multiple Vitamin (MULTIVITAMIN) TABS Take 1 tablet by mouth daily    Historical Provider, MD        Allergies:  Aleve [naproxen sodium]     Review of Systems:   · Constitutional: there has been no unanticipated weight loss. There's been no change in energy level, sleep pattern, or activity level. · Eyes: No visual changes or diplopia. No scleral icterus. · ENT: No Headaches, hearing loss or vertigo. No mouth sores or sore throat. · Cardiovascular: Reviewed in HPI  · Respiratory: No cough or wheezing, no sputum production. No hematemesis. · Gastrointestinal: No abdominal pain, appetite loss, blood in stools. No change in bowel or bladder habits. · Genitourinary: No dysuria, trouble voiding, or hematuria. · Musculoskeletal:  No gait disturbance, weakness or joint complaints. · Integumentary: No rash or pruritis. · Neurological: No headache, diplopia, change in muscle strength, numbness or tingling. No change in gait, balance, coordination, mood, affect, memory, mentation, behavior. · Psychiatric: No anxiety, no depression. · Endocrine: No malaise, fatigue or temperature intolerance. No excessive thirst, fluid intake, or urination. No tremor. · Hematologic/Lymphatic: No abnormal bruising or bleeding, blood clots or swollen lymph nodes. · Allergic/Immunologic: No nasal congestion or hives.     Physical Examination:    Vitals:    02/08/22 1420   BP: 108/62   Pulse: 85   SpO2: 97%        Constitutional and General Appearance: Appears stated age, NAD   Skin:good turgor,intact without lesions  HEENT: EOMI ,normal  Neck:no JVD    Respiratory:  · Normal excursion and expansion without use of accessory muscles  · Resp Auscultation: Normal breath sounds without dullness  Cardiovascular:  · The apical impulses not displaced  · Heart tones are crisp and normal  · Cervical veins are not engorged  · The carotid upstroke is normal in amplitude and contour without delay or bruit  · Peripheral pulses are symmetrical and full  · There is no clubbing, cyanosis of the extremities. · No edema  · Femoral Arteries: 2+ and equal  · Pedal Pulses: 2+ and equal   Abdomen:  · No masses or tenderness  · Liver/Spleen: No Abnormalities Noted  Neurological/Psychiatric:  · Alert and oriented in all spheres  · Moves all extremities well  · Exhibits normal gait balance and coordination  · No abnormalities of mood, affect, memory, mentation, or behavior are noted    Assessment:  Hyperlipidemia  5/26/20> , TG 52, HDL 49, LDL 67    Lipitor 40 mg daily & Zetia 10 mg   Controlled- check yearly      Palpitations, history of  Resolved   7/2017 stress echo was unremarkable  8/28/18 Normal stress echocardiogram study  5/28/19 Normal   5/26/20 EKG> SR 78     Chest Pain   Resolved with tx for GERD  Will call for recurrence        Plan:  Althea William has a stable cardiac status. Cardiac test and lab results personally reviewed by me during this office visit and discussed. No med changes     Continue risk factor modifications. Call for any change in symptoms. Return for regular follow up 6 months        I appreciate the opportunity of cooperating in the care of this individual.    Marlen Reynolds M.D., [facility] [place]    Patient's problem list, medications, allergies, past medical, surgical, social and family histories were reviewed and updated as appropriate.      Janesibe's attestation:

## 2022-02-08 ENCOUNTER — OFFICE VISIT (OUTPATIENT)
Dept: CARDIOLOGY CLINIC | Age: 82
End: 2022-02-08
Payer: MEDICARE

## 2022-02-08 VITALS
DIASTOLIC BLOOD PRESSURE: 62 MMHG | HEIGHT: 64 IN | WEIGHT: 135.1 LBS | SYSTOLIC BLOOD PRESSURE: 108 MMHG | BODY MASS INDEX: 23.06 KG/M2 | OXYGEN SATURATION: 97 % | HEART RATE: 85 BPM

## 2022-02-08 DIAGNOSIS — R07.9 CHEST PAIN, UNSPECIFIED TYPE: ICD-10-CM

## 2022-02-08 DIAGNOSIS — R00.2 PALPITATIONS: ICD-10-CM

## 2022-02-08 DIAGNOSIS — E78.2 MIXED HYPERLIPIDEMIA: Primary | ICD-10-CM

## 2022-02-08 PROCEDURE — 1036F TOBACCO NON-USER: CPT | Performed by: INTERNAL MEDICINE

## 2022-02-08 PROCEDURE — 99214 OFFICE O/P EST MOD 30 MIN: CPT | Performed by: INTERNAL MEDICINE

## 2022-02-08 PROCEDURE — 1123F ACP DISCUSS/DSCN MKR DOCD: CPT | Performed by: INTERNAL MEDICINE

## 2022-02-08 PROCEDURE — 1090F PRES/ABSN URINE INCON ASSESS: CPT | Performed by: INTERNAL MEDICINE

## 2022-02-08 PROCEDURE — G8420 CALC BMI NORM PARAMETERS: HCPCS | Performed by: INTERNAL MEDICINE

## 2022-02-08 PROCEDURE — 4040F PNEUMOC VAC/ADMIN/RCVD: CPT | Performed by: INTERNAL MEDICINE

## 2022-02-08 PROCEDURE — G8427 DOCREV CUR MEDS BY ELIG CLIN: HCPCS | Performed by: INTERNAL MEDICINE

## 2022-02-08 PROCEDURE — G8400 PT W/DXA NO RESULTS DOC: HCPCS | Performed by: INTERNAL MEDICINE

## 2022-02-08 PROCEDURE — G8484 FLU IMMUNIZE NO ADMIN: HCPCS | Performed by: INTERNAL MEDICINE

## 2022-02-08 RX ORDER — MULTIVITAMIN
1 TABLET ORAL DAILY
COMMUNITY
End: 2022-07-26

## 2022-05-16 NOTE — TELEPHONE ENCOUNTER
Received refill request for Naima from [address] [place] [address].     Last ov: 02/08/2022 LES    Last labs: 05/09/2022                       (Care Everywhere)    Last Refill: 12/01/2021 w/ 0 refills    Next appointment: 07/26/2022 LES

## 2022-05-18 RX ORDER — EZETIMIBE 10 MG/1
TABLET ORAL
Qty: 90 TABLET | Refills: 0 | Status: SHIPPED | OUTPATIENT
Start: 2022-05-18 | End: 2022-08-29

## 2022-07-07 RX ORDER — ATORVASTATIN CALCIUM 40 MG/1
TABLET, FILM COATED ORAL
Qty: 90 TABLET | Refills: 0 | Status: SHIPPED | OUTPATIENT
Start: 2022-07-07 | End: 2022-09-27

## 2022-07-07 NOTE — TELEPHONE ENCOUNTER
Received refill request for Atorvastatin from [address] [place] [address].     Last ov:  02/082022 LES    Last labs: 05/09/2022 (care everywhere)    Last Refill: 12/01/2021 #90 w/ 0 refills    Next appointment: 07/26/2022 LES

## 2022-07-26 ENCOUNTER — OFFICE VISIT (OUTPATIENT)
Dept: CARDIOLOGY CLINIC | Age: 82
End: 2022-07-26
Payer: MEDICARE

## 2022-07-26 VITALS
HEART RATE: 80 BPM | OXYGEN SATURATION: 98 % | BODY MASS INDEX: 25.38 KG/M2 | WEIGHT: 137.9 LBS | DIASTOLIC BLOOD PRESSURE: 60 MMHG | HEIGHT: 62 IN | SYSTOLIC BLOOD PRESSURE: 112 MMHG

## 2022-07-26 DIAGNOSIS — R07.9 CHEST PAIN, UNSPECIFIED TYPE: Primary | ICD-10-CM

## 2022-07-26 PROCEDURE — 1123F ACP DISCUSS/DSCN MKR DOCD: CPT | Performed by: INTERNAL MEDICINE

## 2022-07-26 PROCEDURE — 99214 OFFICE O/P EST MOD 30 MIN: CPT | Performed by: INTERNAL MEDICINE

## 2022-07-26 RX ORDER — CYCLOSPORINE 0.5 MG/ML
EMULSION OPHTHALMIC
COMMUNITY
Start: 2022-05-19

## 2022-07-26 NOTE — PROGRESS NOTES
[facility]   Cardiac Follow Up    Referring Provider:  Dr. Avril Jones     Chief Complaint   Patient presents with    Hyperlipidemia    Hypertension    6 Month Follow-Up      History of Present Illness:  Mrs. Maryjo Yeboah is an 80 y.o. female with a history of palpitations, hypertension, and hyperlipidemia. She had a stress echo in 2017 for left arm pain which was unremarkable (aortic sclerosis). She has had a gastric fundoplication in the past.  She had a  pulmonary embolism related to airline travel. Her father  age 48 of MI; her sister  in her 42's of possible arrhythmias. She was referred to ER for evaluation due to concern for giant cell arteritis. Blood work done in the hospital was unremarkable including a normal sed rate and CRP. CT angiogram of neck was normal. 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 She had an MRI of brain that showed no acute intracranial abnormality. Moderate chronic white matter disease. She was in the hospital on 2022 secondary to visual changes. She went to ophthalmologist due to concern for retinal detachment. Episode was gone prior to getting to doc office. She saw Dr. Evie Granger 22 for ongoing visual changes (\"lights and auras\"). Today, she is here for regular follow up. Overall, she states she feels well and has no complaints. She denies exertional chest pain, SALAZAR/PND, palpitations, light-headedness, edema. She does c/o headaches at times, takes OTC migraine med. Her , Dr. Jennifer Juárez, is with her for the visit; they live at [facility]. Past Medical History:   has a past medical history of Essential hypertension and Hyperlipidemia. Surgical History:   has a past surgical history that includes Breast biopsy; Lithotripsy; Hysterectomy; Gastric fundoplication; and shoulder surgery (Left, 2013). Social History:   reports that she has quit smoking.  She has never used smokeless tobacco. She reports current alcohol use of about 4.0 standard drinks per week. She reports that she does not use drugs. Family History:  family history includes Heart Disease in her father. Home Medications:  Prior to Admission medications    Medication Sig Start Date End Date Taking? Authorizing Provider   RESTASIS 0.05 % ophthalmic emulsion  5/19/22  Yes Historical Provider, MD   Polyethylene Glycol 3350 (MIRALAX PO) Take by mouth   Yes Historical Provider, MD   Psyllium (METAMUCIL PO) Take by mouth   Yes Historical Provider, MD   ASPIRIN 81 PO Take by mouth   Yes Historical Provider, MD   atorvastatin (LIPITOR) 40 MG tablet TAKE ONE TABLET BY MOUTH DAILY 7/7/22  Yes Sal Hill MD   ezetimibe (ZETIA) 10 MG tablet TAKE ONE TABLET BY MOUTH DAILY 5/18/22  Yes Sal Hill MD   cetirizine (ZYRTEC) 10 MG tablet Take 10 mg by mouth daily   Yes Historical Provider, MD   fluticasone (FLONASE) 50 MCG/ACT nasal spray 1 spray by Nasal route as needed for Rhinitis   Yes Historical Provider, MD   albuterol (PROVENTIL HFA;VENTOLIN HFA) 108 (90 BASE) MCG/ACT inhaler Inhale 2 puffs into the lungs every 6 hours as needed for Wheezing. Yes Historical Provider, MD   Budesonide-Formoterol Fumarate (SYMBICORT IN) Inhale 2 puffs into the lungs 2 times daily    Yes Historical Provider, MD        Allergies:  Aleve [naproxen sodium]     Review of Systems:   Constitutional: there has been no unanticipated weight loss. There's been no change in energy level, sleep pattern, or activity level. Eyes: No visual changes or diplopia. No scleral icterus. ENT: No Headaches, hearing loss or vertigo. No mouth sores or sore throat. Cardiovascular: Reviewed in HPI  Respiratory: No cough or wheezing, no sputum production. No hematemesis. Gastrointestinal: No abdominal pain, appetite loss, blood in stools. No change in bowel or bladder habits.   Genitourinary: No dysuria, trouble voiding, or hematuria. Musculoskeletal:  No gait disturbance, weakness or joint complaints. Integumentary: No rash or pruritis. Neurological: No headache, diplopia, change in muscle strength, numbness or tingling. No change in gait, balance, coordination, mood, affect, memory, mentation, behavior. Psychiatric: No anxiety, no depression. Endocrine: No malaise, fatigue or temperature intolerance. No excessive thirst, fluid intake, or urination. No tremor. Hematologic/Lymphatic: No abnormal bruising or bleeding, blood clots or swollen lymph nodes. Allergic/Immunologic: No nasal congestion or hives. Physical Examination:    Vitals:    07/26/22 1417   BP: 112/60   Pulse: 80   SpO2: 98%          Constitutional and General Appearance: Appears stated age, NAD   Skin:good turgor,intact without lesions  HEENT: EOMI ,normal  Neck:no JVD    Respiratory:  Normal excursion and expansion without use of accessory muscles  Resp Auscultation: Normal breath sounds without dullness  Cardiovascular: The apical impulses not displaced  Heart tones are crisp and normal  Cervical veins are not engorged  The carotid upstroke is normal in amplitude and contour without delay or bruit  Peripheral pulses are symmetrical and full  There is no clubbing, cyanosis of the extremities. No edema  Femoral Arteries: 2+ and equal  Pedal Pulses: 2+ and equal   Abdomen:  No masses or tenderness  Liver/Spleen: No Abnormalities Noted  Neurological/Psychiatric:  Alert and oriented in all spheres  Moves all extremities well  Exhibits normal gait balance and coordination  No abnormalities of mood, affect, memory, mentation, or behavior are noted    Assessment:    Hypertension  Stable  Blood pressure 112/60, pulse 80, height 5' 2\" (1.575 m), weight 137 lb 14.4 oz (62.6 kg), SpO2 98 %.     Hyperlipidemia  4/12/22> , TG 67, HDL 51, LDL 58  Stable on Lipitor 40 mg daily & Zetia 10 mg   Check yearly      Palpitations, history of  Resolved   7/2017 stress echo was unremarkable  8/28/18 Normal stress echocardiogram study  5/28/19 Normal   4/27/21 EKG> SR 88     Chest Pain, history of   Resolved with tx for GERD  Will call for recurrence        PLAN:  Evelene Pallas has a stable cardiac status. Cardiac test and lab results personally reviewed by me during this office visit and discussed. No med changes  Labs reviewed  Return for follow up in 6 months      I appreciate the opportunity of cooperating in the care of this individual.    Alex Holman M.D., [facility]    Patient's problem list, medications, allergies, past medical, surgical, social and family histories were reviewed and updated as appropriate. Scribe's attestation: This note was scribed in the presence of Dr. Martin Holman MD, by Tyler Sykes RN. The scribe's documentation has been prepared under my direction and personally reviewed by me in its entirety. I confirm that the note above accurately reflects all work, treatment, procedures, and medical decision making performed by me. Jazzy Kruger

## 2022-08-29 RX ORDER — EZETIMIBE 10 MG/1
TABLET ORAL
Qty: 90 TABLET | Refills: 0 | Status: SHIPPED | OUTPATIENT
Start: 2022-08-29 | End: 2022-10-05

## 2022-08-29 NOTE — TELEPHONE ENCOUNTER
Received refill request for Naima from [address] [place] [address].     Last ov: 07/26/2022 LES    Last labs: 04/12/2022 (care everywhere)    Last Refill: 05/18/2022 #90 w/ 0 refills    Next appointment: 01/22/2023 LES (recall)

## 2022-09-27 RX ORDER — ATORVASTATIN CALCIUM 40 MG/1
TABLET, FILM COATED ORAL
Qty: 90 TABLET | Refills: 0 | Status: SHIPPED | OUTPATIENT
Start: 2022-09-27 | End: 2022-10-06 | Stop reason: SDUPTHER

## 2022-10-05 RX ORDER — EZETIMIBE 10 MG/1
TABLET ORAL
Qty: 90 TABLET | Refills: 1 | Status: SHIPPED | OUTPATIENT
Start: 2022-10-05 | End: 2022-10-06 | Stop reason: SDUPTHER

## 2022-10-05 NOTE — TELEPHONE ENCOUNTER
Received refill request for Zetia from [address] [place] [address].     Last ov: 07/26/2022 LES    Last labs: 04/12/2022 care everywhere    Last Refill: 08/29/2022 #90 w/ 0 refills    Next appointment: 01/22/2023 LES (recall)

## 2022-10-06 ENCOUNTER — TELEPHONE (OUTPATIENT)
Dept: CARDIOLOGY CLINIC | Age: 82
End: 2022-10-06

## 2022-10-06 RX ORDER — ATORVASTATIN CALCIUM 40 MG/1
TABLET, FILM COATED ORAL
Qty: 90 TABLET | Refills: 0 | Status: CANCELLED | OUTPATIENT
Start: 2022-10-06

## 2022-10-06 RX ORDER — EZETIMIBE 10 MG/1
TABLET ORAL
Qty: 90 TABLET | Refills: 1 | Status: CANCELLED | OUTPATIENT
Start: 2022-10-06

## 2022-10-06 RX ORDER — EZETIMIBE 10 MG/1
TABLET ORAL
Qty: 90 TABLET | Refills: 1 | Status: SHIPPED | OUTPATIENT
Start: 2022-10-06 | End: 2022-10-06 | Stop reason: SDUPTHER

## 2022-10-06 RX ORDER — ATORVASTATIN CALCIUM 40 MG/1
TABLET, FILM COATED ORAL
Qty: 90 TABLET | Refills: 0 | Status: SHIPPED | OUTPATIENT
Start: 2022-10-06 | End: 2022-10-06 | Stop reason: SDUPTHER

## 2022-10-06 RX ORDER — ATORVASTATIN CALCIUM 40 MG/1
TABLET, FILM COATED ORAL
Qty: 90 TABLET | Refills: 3 | Status: SHIPPED | OUTPATIENT
Start: 2022-10-06

## 2022-10-06 RX ORDER — EZETIMIBE 10 MG/1
TABLET ORAL
Qty: 90 TABLET | Refills: 3 | Status: SHIPPED | OUTPATIENT
Start: 2022-10-06

## 2022-10-06 NOTE — TELEPHONE ENCOUNTER
Medication Refill    Medication needing refilled:  atorvastatin (LIPITOR)  ezetimibe (ZETIA)    Dosage of the medication:  40mg  10mg    How are you taking this medication (QD, BID, TID, QID, PRN):  TAKE ONE TABLET BY MOUTH DAILY  TAKE ONE TABLET BY MOUTH DAILY    30 or 90 day supply called in:  90 90  When will you run out of your medication: 10/08    Which Pharmacy are we sending the medication to?:    [facility] [id number] [address] [phone] Emery Dempsey [phone]   [address]   Phone:  [phone]  Fax:  [phone]

## 2022-12-30 NOTE — PROGRESS NOTES
[facility]   Cardiac Follow Up    Referring Provider:  Dr. Kristie Capone     Chief Complaint   Patient presents with    Hypertension     No cardiac symptoms at this time. Follow-up        History of Present Illness:  Mrs. Dru Jordan is an 80 y.o. female with a history of palpitations, hypertension, and hyperlipidemia. She had a stress echo in 2017 for left arm pain which was unremarkable (aortic sclerosis). She has had a gastric fundoplication in the past.  She had a  pulmonary embolism related to airline travel. Her father  age 48 of MI; her sister  in her 42's of possible arrhythmias. She was referred to ER for evaluation due to concern for giant cell arteritis. Blood work done in the hospital was unremarkable including a normal sed rate and CRP. CT angiogram of neck was normal. 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 She had an MRI of brain that showed no acute intracranial abnormality. Moderate chronic white matter disease. She was in the hospital on 2022 secondary to visual changes. She went to ophthalmologist due to concern for retinal detachment. Episode was gone prior to getting to doc office. She saw Dr. Jessica Coe 22 for ongoing visual changes (\"lights and auras\"). Today, she is here for regular follow up. She is with her . She tested positive for covid in 2022. She took paxlovid. She has a lingering cough. Pt denies exertional chest pain, SALAZAR/PND, palpitations, light-headedness, edema. She states GERD is controlled well. Past Medical History:   has a past medical history of COVID, Essential hypertension, and Hyperlipidemia. Surgical History:   has a past surgical history that includes Breast biopsy; Lithotripsy; Hysterectomy; Gastric fundoplication; and shoulder surgery (Left, 2013). Social History:   reports that she has quit smoking.  She has never used smokeless tobacco. She reports current alcohol use of about 4.0 standard drinks per week. She reports that she does not use drugs.     Family History:  family history includes Heart Disease in her father.     Home Medications:  Prior to Admission medications    Medication Sig Start Date End Date Taking? Authorizing Provider   ARTIFICIAL TEAR OP Apply to eye   Yes Historical Provider, MD   ezetimibe (ZETIA) 10 MG tablet ONE PO DAILY 10/6/22  Yes Derrell Nath MD   atorvastatin (LIPITOR) 40 MG tablet TAKE ONE TABLET BY MOUTH DAILY 10/6/22  Yes Derrell Nath MD   RESTASIS 0.05 % ophthalmic emulsion  5/19/22  Yes Historical Provider, MD   Polyethylene Glycol 3350 (MIRALAX PO) Take by mouth   Yes Historical Provider, MD   Psyllium (METAMUCIL PO) Take by mouth   Yes Historical Provider, MD   ASPIRIN 81 PO Take by mouth   Yes Historical Provider, MD   cetirizine (ZYRTEC) 10 MG tablet Take 10 mg by mouth daily   Yes Historical Provider, MD   fluticasone (FLONASE) 50 MCG/ACT nasal spray 1 spray by Nasal route as needed for Rhinitis   Yes Historical Provider, MD   albuterol (PROVENTIL HFA;VENTOLIN HFA) 108 (90 BASE) MCG/ACT inhaler Inhale 2 puffs into the lungs every 6 hours as needed for Wheezing.   Yes Historical Provider, MD   Budesonide-Formoterol Fumarate (SYMBICORT IN) Inhale 2 puffs into the lungs 2 times daily    Yes Historical Provider, MD        Allergies:  Aleve [naproxen sodium]     Review of Systems:   Constitutional: there has been no unanticipated weight loss. There's been no change in energy level, sleep pattern, or activity level.     Eyes: No visual changes or diplopia. No scleral icterus.  ENT: No Headaches, hearing loss or vertigo. No mouth sores or sore throat.  Cardiovascular: Reviewed in HPI  Respiratory: No cough or wheezing, no sputum production. No hematemesis.    Gastrointestinal: No abdominal pain, appetite loss, blood in stools. No change in bowel or bladder habits.  Genitourinary: No dysuria,  trouble voiding, or hematuria. Musculoskeletal:  No gait disturbance, weakness or joint complaints. Integumentary: No rash or pruritis. Neurological: No headache, diplopia, change in muscle strength, numbness or tingling. No change in gait, balance, coordination, mood, affect, memory, mentation, behavior. Psychiatric: No anxiety, no depression. Endocrine: No malaise, fatigue or temperature intolerance. No excessive thirst, fluid intake, or urination. No tremor. Hematologic/Lymphatic: No abnormal bruising or bleeding, blood clots or swollen lymph nodes. Allergic/Immunologic: No nasal congestion or hives. Physical Examination:    Vitals:    01/20/23 1040   BP: 98/66   Pulse: 90   SpO2: 97%            Constitutional and General Appearance: Appears stated age, NAD   Skin:good turgor,intact without lesions  HEENT: EOMI ,normal  Neck:no JVD    Respiratory:  Normal excursion and expansion without use of accessory muscles  Resp Auscultation: Normal breath sounds without dullness  Cardiovascular: The apical impulses not displaced  Heart tones are crisp and normal  Cervical veins are not engorged  The carotid upstroke is normal in amplitude and contour without delay or bruit  Peripheral pulses are symmetrical and full  There is no clubbing, cyanosis of the extremities. No edema  Femoral Arteries: 2+ and equal  Pedal Pulses: 2+ and equal   Abdomen:  No masses or tenderness  Liver/Spleen: No Abnormalities Noted  Neurological/Psychiatric:  Alert and oriented in all spheres  Moves all extremities well  Exhibits normal gait balance and coordination  No abnormalities of mood, affect, memory, mentation, or behavior are noted    Assessment:    Hypertension  Stable  Blood pressure 98/66, pulse 90, height 5' 2\" (1.575 m), weight 130 lb 11.2 oz (59.3 kg), SpO2 97 %.   Continue current medication regimen    Bp rechecked by me > right arm, sitting, 110/60    Hyperlipidemia  4/12/22> , TG 67, HDL 51, LDL 58  Stable  Continue on Lipitor 40 mg daily & Zetia 10 mg   Check yearly      Palpitations, history of  Denies   7/2017 stress echo was unremarkable  8/28/18 Normal stress echocardiogram study  5/28/19 Normal stress echo  4/27/21 EKG> SR 88     Chest Pain, history of   Resolved with tx for GERD  Will call for recurrence        PLAN:  Cardiac test and lab results personally reviewed by me during this office visit and discussed.     Ok to take Minoxidil for hair, but monitor BP daily. If sbp consistently below 100, do not use it. Recommend trying topical first.  Continue risk factor modifications.   Call for any change in symptoms, call to report any changes in shortness of breath or development of chest pain with activity.    Follow up in 6 mos      I appreciate the opportunity of cooperating in the care of this individual.    Derrell Nath M.D., [facility]    Patient's problem list, medications, allergies, past medical, surgical, social and family histories were reviewed and updated as appropriate.    Scribe's attestation: This note was scribed in the presence of Dr Nath by Dean Brown RN.    The scribe's documentation has been prepared under my direction and personally reviewed by me in its entirety. I confirm that the note above accurately reflects all work, treatment, procedures, and medical decision making performed by me.                .

## 2023-01-20 ENCOUNTER — OFFICE VISIT (OUTPATIENT)
Dept: CARDIOLOGY CLINIC | Age: 83
End: 2023-01-20

## 2023-01-20 VITALS
OXYGEN SATURATION: 97 % | SYSTOLIC BLOOD PRESSURE: 110 MMHG | HEIGHT: 62 IN | WEIGHT: 130.7 LBS | HEART RATE: 90 BPM | BODY MASS INDEX: 24.05 KG/M2 | DIASTOLIC BLOOD PRESSURE: 60 MMHG

## 2023-01-20 DIAGNOSIS — E78.2 MIXED HYPERLIPIDEMIA: ICD-10-CM

## 2023-01-20 DIAGNOSIS — R07.9 CHEST PAIN, UNSPECIFIED TYPE: ICD-10-CM

## 2023-01-20 DIAGNOSIS — I10 ESSENTIAL HYPERTENSION: Primary | ICD-10-CM

## 2023-01-20 DIAGNOSIS — R00.2 PALPITATIONS: ICD-10-CM

## 2023-01-20 RX ORDER — EZETIMIBE 10 MG/1
TABLET ORAL
Qty: 90 TABLET | Refills: 3 | Status: SHIPPED | OUTPATIENT
Start: 2023-01-20

## 2023-01-20 NOTE — PATIENT INSTRUCTIONS
Ok to take Minoxidil for hair, but monitor BP daily. If sbp consistently below 100, do not use it - Recommend trying topical first.  Continue risk factor modifications. Call for any change in symptoms, call to report any changes in shortness of breath or development of chest pain with activity.     Follow up in 6 mos

## 2023-06-30 ENCOUNTER — OFFICE VISIT (OUTPATIENT)
Dept: CARDIOLOGY CLINIC | Age: 83
End: 2023-06-30
Payer: MEDICARE

## 2023-06-30 VITALS
DIASTOLIC BLOOD PRESSURE: 62 MMHG | SYSTOLIC BLOOD PRESSURE: 116 MMHG | HEIGHT: 62 IN | WEIGHT: 130.4 LBS | HEART RATE: 88 BPM | OXYGEN SATURATION: 98 % | BODY MASS INDEX: 24 KG/M2

## 2023-06-30 DIAGNOSIS — I10 ESSENTIAL HYPERTENSION: Primary | ICD-10-CM

## 2023-06-30 DIAGNOSIS — R00.2 PALPITATIONS: ICD-10-CM

## 2023-06-30 DIAGNOSIS — R07.9 CHEST PAIN, UNSPECIFIED TYPE: ICD-10-CM

## 2023-06-30 DIAGNOSIS — E78.2 MIXED HYPERLIPIDEMIA: ICD-10-CM

## 2023-06-30 PROCEDURE — G8427 DOCREV CUR MEDS BY ELIG CLIN: HCPCS | Performed by: INTERNAL MEDICINE

## 2023-06-30 PROCEDURE — 1090F PRES/ABSN URINE INCON ASSESS: CPT | Performed by: INTERNAL MEDICINE

## 2023-06-30 PROCEDURE — 99214 OFFICE O/P EST MOD 30 MIN: CPT | Performed by: INTERNAL MEDICINE

## 2023-06-30 PROCEDURE — 1036F TOBACCO NON-USER: CPT | Performed by: INTERNAL MEDICINE

## 2023-06-30 PROCEDURE — 3074F SYST BP LT 130 MM HG: CPT | Performed by: INTERNAL MEDICINE

## 2023-06-30 PROCEDURE — 1123F ACP DISCUSS/DSCN MKR DOCD: CPT | Performed by: INTERNAL MEDICINE

## 2023-06-30 PROCEDURE — 3078F DIAST BP <80 MM HG: CPT | Performed by: INTERNAL MEDICINE

## 2023-06-30 PROCEDURE — G8400 PT W/DXA NO RESULTS DOC: HCPCS | Performed by: INTERNAL MEDICINE

## 2023-06-30 PROCEDURE — G8420 CALC BMI NORM PARAMETERS: HCPCS | Performed by: INTERNAL MEDICINE

## 2023-06-30 RX ORDER — FINASTERIDE 1 MG/1
1 TABLET, FILM COATED ORAL DAILY
COMMUNITY

## 2023-11-20 PROBLEM — R07.9 CHEST PAIN: Status: ACTIVE | Noted: 2023-11-20

## 2023-11-20 NOTE — PROGRESS NOTES
[facility]   Cardiac Follow Up    Referring Provider:  Dr. Sugey Arias     Chief Complaint   Patient presents with    Hypertension    Hyperlipidemia    6 Month Follow-Up        History of Present Illness:  Mrs. Viky Moncada is an 80 y.o. female with a history of palpitations, hypertension, and hyperlipidemia. She had a stress echo in 2017 for left arm pain which was unremarkable (aortic sclerosis). She has had a gastric fundoplication in the past.  She had a  pulmonary embolism related to airline travel. Her father  age 48 of MI; her sister  in her 42's of possible arrhythmias. She was referred to ER for evaluation due to concern for giant cell arteritis. Blood work done in the hospital was unremarkable including a normal sed rate and CRP. CT angiogram of neck was normal. 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 She had an MRI of brain that showed no acute intracranial abnormality. Moderate chronic white matter disease. She was in the hospital on 2022 secondary to visual changes. She went to ophthalmologist due to concern for retinal detachment. Episode was gone prior to getting to doc office. She saw Dr. Estela Crockett 22 for ongoing visual changes (\"lights and auras\"). She tested positive for covid in 2022. She took paxlovid. Today, she is here for regular follow up. States that she is doing well. She has 4 Daughters. Reports that they will not be doing much for [place]. She had Covid in October and has recovered well. She has had the flu and RSV vaccine this year. Patient denies exertional chest pain, SALAZAR/PND, palpitations, light-headedness, edema. Past Medical History:   has a past medical history of COVID, Essential hypertension, and Hyperlipidemia. Surgical History:   has a past surgical history that includes Breast biopsy; Lithotripsy;  Hysterectomy; Gastric fundoplication; and

## 2023-12-11 RX ORDER — ATORVASTATIN CALCIUM 40 MG/1
TABLET, FILM COATED ORAL
Qty: 90 TABLET | Refills: 3 | Status: SHIPPED | OUTPATIENT
Start: 2023-12-11

## 2023-12-14 ENCOUNTER — OFFICE VISIT (OUTPATIENT)
Dept: CARDIOLOGY CLINIC | Age: 83
End: 2023-12-14
Payer: MEDICARE

## 2023-12-14 VITALS
HEART RATE: 85 BPM | DIASTOLIC BLOOD PRESSURE: 64 MMHG | BODY MASS INDEX: 23.37 KG/M2 | WEIGHT: 127 LBS | HEIGHT: 62 IN | OXYGEN SATURATION: 97 % | SYSTOLIC BLOOD PRESSURE: 118 MMHG

## 2023-12-14 DIAGNOSIS — I10 ESSENTIAL HYPERTENSION: Primary | ICD-10-CM

## 2023-12-14 DIAGNOSIS — R00.2 PALPITATION: ICD-10-CM

## 2023-12-14 DIAGNOSIS — R07.9 CHEST PAIN, UNSPECIFIED TYPE: ICD-10-CM

## 2023-12-14 DIAGNOSIS — E78.49 OTHER HYPERLIPIDEMIA: ICD-10-CM

## 2023-12-14 PROCEDURE — 1090F PRES/ABSN URINE INCON ASSESS: CPT | Performed by: INTERNAL MEDICINE

## 2023-12-14 PROCEDURE — 1123F ACP DISCUSS/DSCN MKR DOCD: CPT | Performed by: INTERNAL MEDICINE

## 2023-12-14 PROCEDURE — 3078F DIAST BP <80 MM HG: CPT | Performed by: INTERNAL MEDICINE

## 2023-12-14 PROCEDURE — G8400 PT W/DXA NO RESULTS DOC: HCPCS | Performed by: INTERNAL MEDICINE

## 2023-12-14 PROCEDURE — 1036F TOBACCO NON-USER: CPT | Performed by: INTERNAL MEDICINE

## 2023-12-14 PROCEDURE — G8420 CALC BMI NORM PARAMETERS: HCPCS | Performed by: INTERNAL MEDICINE

## 2023-12-14 PROCEDURE — 99214 OFFICE O/P EST MOD 30 MIN: CPT | Performed by: INTERNAL MEDICINE

## 2023-12-14 PROCEDURE — G8427 DOCREV CUR MEDS BY ELIG CLIN: HCPCS | Performed by: INTERNAL MEDICINE

## 2023-12-14 PROCEDURE — 3074F SYST BP LT 130 MM HG: CPT | Performed by: INTERNAL MEDICINE

## 2023-12-14 PROCEDURE — G8484 FLU IMMUNIZE NO ADMIN: HCPCS | Performed by: INTERNAL MEDICINE

## 2023-12-14 RX ORDER — EZETIMIBE 10 MG/1
TABLET ORAL
Qty: 90 TABLET | Refills: 3 | Status: SHIPPED | OUTPATIENT
Start: 2023-12-14

## 2024-06-07 NOTE — PROGRESS NOTES
complaints.  Integumentary: No rash or pruritis.  Neurological: No headache, diplopia, change in muscle strength, numbness or tingling. No change in gait, balance, coordination, mood, affect, memory, mentation, behavior.  Psychiatric: No anxiety, no depression.  Endocrine: No malaise, fatigue or temperature intolerance. No excessive thirst, fluid intake, or urination. No tremor.  Hematologic/Lymphatic: No abnormal bruising or bleeding, blood clots or swollen lymph nodes.  Allergic/Immunologic: No nasal congestion or hives.    Physical Examination:    Vitals:    07/05/24 1256   BP: 120/62   Pulse: 88   SpO2: 98%            Constitutional and General Appearance: Appears stated age, NAD   Skin:good turgor,intact without lesions  HEENT: EOMI ,normal  Neck:no JVD    Respiratory:  Normal excursion and expansion without use of accessory muscles  Resp Auscultation: Normal breath sounds without dullness  Cardiovascular:  The apical impulses not displaced  Heart tones are crisp and normal  Cervical veins are not engorged  The carotid upstroke is normal in amplitude and contour without delay or bruit  Peripheral pulses are symmetrical and full  There is no clubbing, cyanosis of the extremities.  No edema  Femoral Arteries: 2+ and equal  Pedal Pulses: 2+ and equal   Abdomen:  No masses or tenderness  Liver/Spleen: No Abnormalities Noted  Neurological/Psychiatric:  Alert and oriented in all spheres  Moves all extremities well  Exhibits normal gait balance and coordination  No abnormalities of mood, affect, memory, mentation, or behavior are noted    Assessment:    Hypertension  Stable  Blood pressure 120/62, pulse 88, height 1.575 m (5' 2\"), weight 57.7 kg (127 lb 4.8 oz), SpO2 98 %.  Continue on current medication regimen    Hyperlipidemia  4/12/22> , TG 67, HDL 51, LDL 58  3/20/23  TG 82  HDL 62 LDL 81  Stable  Continue on Lipitor 40 mg daily & Zetia 10 mg   Check yearly      Palpitations, history of  denies

## 2024-07-05 ENCOUNTER — OFFICE VISIT (OUTPATIENT)
Dept: CARDIOLOGY CLINIC | Age: 84
End: 2024-07-05
Payer: MEDICARE

## 2024-07-05 VITALS
HEIGHT: 62 IN | SYSTOLIC BLOOD PRESSURE: 120 MMHG | WEIGHT: 127.3 LBS | OXYGEN SATURATION: 98 % | HEART RATE: 88 BPM | BODY MASS INDEX: 23.42 KG/M2 | DIASTOLIC BLOOD PRESSURE: 62 MMHG

## 2024-07-05 DIAGNOSIS — R00.2 PALPITATION: ICD-10-CM

## 2024-07-05 DIAGNOSIS — R07.9 CHEST PAIN, UNSPECIFIED TYPE: ICD-10-CM

## 2024-07-05 DIAGNOSIS — I10 ESSENTIAL HYPERTENSION: Primary | ICD-10-CM

## 2024-07-05 DIAGNOSIS — E78.49 OTHER HYPERLIPIDEMIA: ICD-10-CM

## 2024-07-05 PROCEDURE — G8400 PT W/DXA NO RESULTS DOC: HCPCS | Performed by: INTERNAL MEDICINE

## 2024-07-05 PROCEDURE — 1123F ACP DISCUSS/DSCN MKR DOCD: CPT | Performed by: INTERNAL MEDICINE

## 2024-07-05 PROCEDURE — G8420 CALC BMI NORM PARAMETERS: HCPCS | Performed by: INTERNAL MEDICINE

## 2024-07-05 PROCEDURE — 3074F SYST BP LT 130 MM HG: CPT | Performed by: INTERNAL MEDICINE

## 2024-07-05 PROCEDURE — 3078F DIAST BP <80 MM HG: CPT | Performed by: INTERNAL MEDICINE

## 2024-07-05 PROCEDURE — 1090F PRES/ABSN URINE INCON ASSESS: CPT | Performed by: INTERNAL MEDICINE

## 2024-07-05 PROCEDURE — 1036F TOBACCO NON-USER: CPT | Performed by: INTERNAL MEDICINE

## 2024-07-05 PROCEDURE — G8427 DOCREV CUR MEDS BY ELIG CLIN: HCPCS | Performed by: INTERNAL MEDICINE

## 2024-07-05 PROCEDURE — 99214 OFFICE O/P EST MOD 30 MIN: CPT | Performed by: INTERNAL MEDICINE

## 2024-07-05 NOTE — PATIENT INSTRUCTIONS
No medication changes  Continue risk factor modifications.   Call for any change in symptoms, call to report any changes in shortness of breath or development of chest pain with activity.    Follow up in 6 mos

## 2024-12-03 ENCOUNTER — TELEPHONE (OUTPATIENT)
Dept: CARDIOLOGY CLINIC | Age: 84
End: 2024-12-03

## 2024-12-03 NOTE — TELEPHONE ENCOUNTER
Pt came in to [place] office due to her family dr wants to take her off of the lipitor and she is not comfortable with this and wants to talk to Dr. Nath regarding this matter.  Please advise pt

## 2024-12-04 NOTE — TELEPHONE ENCOUNTER
Spoke to Marizol.    She explains that she saw a new PCP who is recommending her to stop her Zetia.  Marizol is not sure why he recommended stopping it.  She states she is tolerating it well.  She is asking for LES's input.  For now, she is continuing to take it.

## 2024-12-10 NOTE — TELEPHONE ENCOUNTER
Spoke with her and do not advise stopping statin or zetia without speaking to Dr Kirk. Called his office to have him call me back

## 2025-01-13 RX ORDER — ATORVASTATIN CALCIUM 40 MG/1
TABLET, FILM COATED ORAL
Qty: 90 TABLET | Refills: 3 | Status: SHIPPED | OUTPATIENT
Start: 2025-01-13

## 2025-01-13 RX ORDER — ATORVASTATIN CALCIUM 40 MG/1
TABLET, FILM COATED ORAL
Qty: 90 TABLET | Refills: 3 | OUTPATIENT
Start: 2025-01-13

## 2025-01-13 NOTE — TELEPHONE ENCOUNTER
Received refill request for ATORVASTATIN 40 MG TABLET  from [facility] pharmacy.     Last OV: 7/5/24    Next OV: 2/3/25    Last Labs: lipid 12/18/20    Last Filled: 12/11/24

## 2025-01-13 NOTE — TELEPHONE ENCOUNTER
Received refill request for atorvastatin from [facility] pharmacy.    Last ov: 07/05/2024 LES    Last labs: 10/28/2024 Care Everywhere    Last Refill: 12/11/2023 #90 w/ 3    Next appointment: 02/03/2025 LES

## 2025-01-30 NOTE — PROGRESS NOTES
[facility]   Cardiac Follow Up    Referring Provider:  Dr. Mina Tyson     Chief Complaint   Patient presents with    6 Month Follow-Up    Hyperlipidemia    Hypertension        History of Present Illness:  Mrs. Vasquez is an 84 y.o. female with a history of palpitations, hypertension, and hyperlipidemia. She had a stress echo in 2017 for left arm pain which was unremarkable (aortic sclerosis). She has had a gastric fundoplication in the past.  She had a  pulmonary embolism related to airline travel. Her father  age 50 of MI; her sister  in her 40's of possible arrhythmias.     She was referred to ER for evaluation due to concern for giant cell arteritis. Blood work done in the hospital was unremarkable including a normal sed rate and CRP. CT angiogram of neck was normal. 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  She had an MRI of brain that showed no acute intracranial abnormality. Moderate chronic white matter disease.    She was in the hospital on 2022 secondary to visual changes.   She went to ophthalmologist due to concern for retinal detachment. Episode was gone prior to getting to doc office.  She saw Dr. Leos 22 for ongoing visual changes (\"lights and auras\").     She tested positive for covid in 2022.  She took paxlovid.     Today, Mrs. Vasquez is here for a 6 month follow up.  Reports that she is doing okay.  She has a Grand-Daughter that will be graduating Law School in May from a school in [place].  She went to the ED with complaints of arm pain.  The arm pain is around her wrist and elbow area.  Stress and activity does not bring this pain on.  She mostly notices it when she is laying in bed.  She was found to have a UTI.  Her initial troponin was mildly elevated with improvement on the second one.  Tenderness to touch noted to left antecubital region on exam today but reports that it is not as

## 2025-02-03 ENCOUNTER — OFFICE VISIT (OUTPATIENT)
Dept: CARDIOLOGY CLINIC | Age: 85
End: 2025-02-03
Payer: MEDICARE

## 2025-02-03 VITALS
BODY MASS INDEX: 22.97 KG/M2 | OXYGEN SATURATION: 96 % | DIASTOLIC BLOOD PRESSURE: 64 MMHG | WEIGHT: 124.8 LBS | SYSTOLIC BLOOD PRESSURE: 112 MMHG | HEIGHT: 62 IN | HEART RATE: 92 BPM

## 2025-02-03 DIAGNOSIS — E78.49 OTHER HYPERLIPIDEMIA: ICD-10-CM

## 2025-02-03 DIAGNOSIS — I10 ESSENTIAL HYPERTENSION: Primary | ICD-10-CM

## 2025-02-03 DIAGNOSIS — R00.2 PALPITATION: ICD-10-CM

## 2025-02-03 PROCEDURE — G8427 DOCREV CUR MEDS BY ELIG CLIN: HCPCS | Performed by: INTERNAL MEDICINE

## 2025-02-03 PROCEDURE — G8400 PT W/DXA NO RESULTS DOC: HCPCS | Performed by: INTERNAL MEDICINE

## 2025-02-03 PROCEDURE — 1036F TOBACCO NON-USER: CPT | Performed by: INTERNAL MEDICINE

## 2025-02-03 PROCEDURE — 93000 ELECTROCARDIOGRAM COMPLETE: CPT | Performed by: INTERNAL MEDICINE

## 2025-02-03 PROCEDURE — 3078F DIAST BP <80 MM HG: CPT | Performed by: INTERNAL MEDICINE

## 2025-02-03 PROCEDURE — 3074F SYST BP LT 130 MM HG: CPT | Performed by: INTERNAL MEDICINE

## 2025-02-03 PROCEDURE — 1159F MED LIST DOCD IN RCRD: CPT | Performed by: INTERNAL MEDICINE

## 2025-02-03 PROCEDURE — 99214 OFFICE O/P EST MOD 30 MIN: CPT | Performed by: INTERNAL MEDICINE

## 2025-02-03 PROCEDURE — G8420 CALC BMI NORM PARAMETERS: HCPCS | Performed by: INTERNAL MEDICINE

## 2025-02-03 PROCEDURE — 1123F ACP DISCUSS/DSCN MKR DOCD: CPT | Performed by: INTERNAL MEDICINE

## 2025-02-03 PROCEDURE — 1090F PRES/ABSN URINE INCON ASSESS: CPT | Performed by: INTERNAL MEDICINE

## 2025-02-03 RX ORDER — EZETIMIBE 10 MG/1
TABLET ORAL
Qty: 90 TABLET | Refills: 3 | Status: CANCELLED | OUTPATIENT
Start: 2025-02-03

## 2025-02-03 RX ORDER — EZETIMIBE 10 MG/1
TABLET ORAL
Qty: 90 TABLET | Refills: 3 | Status: SHIPPED | OUTPATIENT
Start: 2025-02-03

## 2025-05-06 DIAGNOSIS — R42 DIZZINESS: Primary | ICD-10-CM

## 2025-08-18 ENCOUNTER — OFFICE VISIT (OUTPATIENT)
Dept: CARDIOLOGY CLINIC | Age: 85
End: 2025-08-18
Payer: MEDICARE

## 2025-08-18 VITALS
HEART RATE: 78 BPM | HEIGHT: 62 IN | BODY MASS INDEX: 22.69 KG/M2 | SYSTOLIC BLOOD PRESSURE: 110 MMHG | WEIGHT: 123.3 LBS | OXYGEN SATURATION: 97 % | DIASTOLIC BLOOD PRESSURE: 58 MMHG

## 2025-08-18 DIAGNOSIS — R00.2 PALPITATION: ICD-10-CM

## 2025-08-18 DIAGNOSIS — R01.1 HEART MURMUR: ICD-10-CM

## 2025-08-18 DIAGNOSIS — I10 ESSENTIAL HYPERTENSION: Primary | ICD-10-CM

## 2025-08-18 DIAGNOSIS — E78.49 OTHER HYPERLIPIDEMIA: ICD-10-CM

## 2025-08-18 PROCEDURE — 99214 OFFICE O/P EST MOD 30 MIN: CPT | Performed by: INTERNAL MEDICINE

## 2025-08-18 PROCEDURE — 3078F DIAST BP <80 MM HG: CPT | Performed by: INTERNAL MEDICINE

## 2025-08-18 PROCEDURE — 1159F MED LIST DOCD IN RCRD: CPT | Performed by: INTERNAL MEDICINE

## 2025-08-18 PROCEDURE — G8400 PT W/DXA NO RESULTS DOC: HCPCS | Performed by: INTERNAL MEDICINE

## 2025-08-18 PROCEDURE — 3074F SYST BP LT 130 MM HG: CPT | Performed by: INTERNAL MEDICINE

## 2025-08-18 PROCEDURE — 1090F PRES/ABSN URINE INCON ASSESS: CPT | Performed by: INTERNAL MEDICINE

## 2025-08-18 PROCEDURE — G8420 CALC BMI NORM PARAMETERS: HCPCS | Performed by: INTERNAL MEDICINE

## 2025-08-18 PROCEDURE — G8427 DOCREV CUR MEDS BY ELIG CLIN: HCPCS | Performed by: INTERNAL MEDICINE

## 2025-08-18 PROCEDURE — 1036F TOBACCO NON-USER: CPT | Performed by: INTERNAL MEDICINE

## 2025-08-18 PROCEDURE — 1123F ACP DISCUSS/DSCN MKR DOCD: CPT | Performed by: INTERNAL MEDICINE
